# Patient Record
Sex: MALE
[De-identification: names, ages, dates, MRNs, and addresses within clinical notes are randomized per-mention and may not be internally consistent; named-entity substitution may affect disease eponyms.]

---

## 2017-08-07 ENCOUNTER — HOSPITAL ENCOUNTER (OUTPATIENT)
Dept: HOSPITAL 42 - ED | Age: 62
Setting detail: OBSERVATION
LOS: 3 days | Discharge: HOME | End: 2017-08-10
Attending: INTERNAL MEDICINE | Admitting: INTERNAL MEDICINE
Payer: MEDICARE

## 2017-08-07 VITALS — BODY MASS INDEX: 34.9 KG/M2

## 2017-08-07 DIAGNOSIS — H40.10X0: ICD-10-CM

## 2017-08-07 DIAGNOSIS — Z89.422: ICD-10-CM

## 2017-08-07 DIAGNOSIS — E11.65: ICD-10-CM

## 2017-08-07 DIAGNOSIS — E78.00: ICD-10-CM

## 2017-08-07 DIAGNOSIS — H54.7: ICD-10-CM

## 2017-08-07 DIAGNOSIS — I50.9: ICD-10-CM

## 2017-08-07 DIAGNOSIS — L97.529: ICD-10-CM

## 2017-08-07 DIAGNOSIS — H26.9: ICD-10-CM

## 2017-08-07 DIAGNOSIS — D64.9: ICD-10-CM

## 2017-08-07 DIAGNOSIS — E11.22: ICD-10-CM

## 2017-08-07 DIAGNOSIS — E11.39: ICD-10-CM

## 2017-08-07 DIAGNOSIS — E11.51: ICD-10-CM

## 2017-08-07 DIAGNOSIS — N18.9: ICD-10-CM

## 2017-08-07 DIAGNOSIS — R00.1: ICD-10-CM

## 2017-08-07 DIAGNOSIS — R07.89: ICD-10-CM

## 2017-08-07 DIAGNOSIS — Z79.82: ICD-10-CM

## 2017-08-07 DIAGNOSIS — I13.0: ICD-10-CM

## 2017-08-07 DIAGNOSIS — Z79.84: ICD-10-CM

## 2017-08-07 DIAGNOSIS — Z95.5: ICD-10-CM

## 2017-08-07 DIAGNOSIS — F32.9: ICD-10-CM

## 2017-08-07 DIAGNOSIS — F41.9: ICD-10-CM

## 2017-08-07 DIAGNOSIS — M50.80: Primary | ICD-10-CM

## 2017-08-07 DIAGNOSIS — M48.02: ICD-10-CM

## 2017-08-07 DIAGNOSIS — Z91.19: ICD-10-CM

## 2017-08-07 DIAGNOSIS — I25.10: ICD-10-CM

## 2017-08-07 DIAGNOSIS — E11.621: ICD-10-CM

## 2017-08-07 LAB
ALBUMIN SERPL-MCNC: 4.2 G/DL (ref 3–4.8)
ALBUMIN/GLOB SERPL: 1.3 {RATIO} (ref 1.1–1.8)
ALT SERPL-CCNC: 27 U/L (ref 7–56)
APPEARANCE UR: CLEAR
AST SERPL-CCNC: 35 U/L (ref 15–59)
BILIRUB UR-MCNC: NEGATIVE MG/DL
BUN SERPL-MCNC: 38 MG/DL (ref 7–21)
CALCIUM SERPL-MCNC: 9.4 MG/DL (ref 8.4–10.5)
COLOR UR: YELLOW
EPI CELLS #/AREA URNS HPF: (no result) /HPF (ref 0–5)
ERYTHROCYTE [DISTWIDTH] IN BLOOD BY AUTOMATED COUNT: 13.9 % (ref 11.5–14.5)
GFR NON-AFRICAN AMERICAN: 29
GLUCOSE UR STRIP-MCNC: 250 MG/DL
HGB BLD-MCNC: 10.5 GM/DL (ref 14–18)
LEUKOCYTE ESTERASE UR-ACNC: NEGATIVE LEU/UL
MCH RBC QN AUTO: 29.2 PG (ref 25–35)
MCHC RBC AUTO-ENTMCNC: 35.7 G/DL (ref 31–37)
MCV RBC AUTO: 81.7 FL (ref 80–105)
PH UR STRIP: 6 [PH] (ref 4.7–8)
PLATELET # BLD: 257 10^3/UL (ref 120–450)
PMV BLD AUTO: 10.5 FL (ref 7–11)
PROT UR STRIP-MCNC: NEGATIVE MG/DL
RBC # BLD AUTO: 3.6 10^6/UL (ref 3.5–6.1)
RBC # UR STRIP: (no result) /UL
RBC #/AREA URNS HPF: (no result) /HPF (ref 0–2)
SP GR UR STRIP: 1.01 (ref 1–1.03)
TROPONIN I SERPL-MCNC: 0.03 NG/ML
URINE NITRATE: NEGATIVE
UROBILINOGEN UR STRIP-ACNC: 0.2 E.U./DL
WBC # BLD AUTO: 8 10^3/UL (ref 4.5–11)
WBC #/AREA URNS HPF: (no result) /HPF (ref 0–6)

## 2017-08-07 PROCEDURE — 72050 X-RAY EXAM NECK SPINE 4/5VWS: CPT

## 2017-08-07 PROCEDURE — 84484 ASSAY OF TROPONIN QUANT: CPT

## 2017-08-07 PROCEDURE — 93005 ELECTROCARDIOGRAM TRACING: CPT

## 2017-08-07 PROCEDURE — 83540 ASSAY OF IRON: CPT

## 2017-08-07 PROCEDURE — 97116 GAIT TRAINING THERAPY: CPT

## 2017-08-07 PROCEDURE — 83036 HEMOGLOBIN GLYCOSYLATED A1C: CPT

## 2017-08-07 PROCEDURE — 99285 EMERGENCY DEPT VISIT HI MDM: CPT

## 2017-08-07 PROCEDURE — 83550 IRON BINDING TEST: CPT

## 2017-08-07 PROCEDURE — 87086 URINE CULTURE/COLONY COUNT: CPT

## 2017-08-07 PROCEDURE — 83615 LACTATE (LD) (LDH) ENZYME: CPT

## 2017-08-07 PROCEDURE — 82948 REAGENT STRIP/BLOOD GLUCOSE: CPT

## 2017-08-07 PROCEDURE — 80053 COMPREHEN METABOLIC PANEL: CPT

## 2017-08-07 PROCEDURE — 82550 ASSAY OF CK (CPK): CPT

## 2017-08-07 PROCEDURE — 85027 COMPLETE CBC AUTOMATED: CPT

## 2017-08-07 PROCEDURE — 80061 LIPID PANEL: CPT

## 2017-08-07 PROCEDURE — 82746 ASSAY OF FOLIC ACID SERUM: CPT

## 2017-08-07 PROCEDURE — 82607 VITAMIN B-12: CPT

## 2017-08-07 PROCEDURE — 81001 URINALYSIS AUTO W/SCOPE: CPT

## 2017-08-07 PROCEDURE — 36415 COLL VENOUS BLD VENIPUNCTURE: CPT

## 2017-08-07 PROCEDURE — 72141 MRI NECK SPINE W/O DYE: CPT

## 2017-08-07 PROCEDURE — 97162 PT EVAL MOD COMPLEX 30 MIN: CPT

## 2017-08-07 PROCEDURE — 71010: CPT

## 2017-08-07 RX ADMIN — ACETAZOLAMIDE SCH MG: 500 CAPSULE, EXTENDED RELEASE ORAL at 11:04

## 2017-08-07 RX ADMIN — METOPROLOL SUCCINATE SCH: 25 TABLET, EXTENDED RELEASE ORAL at 12:32

## 2017-08-07 RX ADMIN — INSULIN HUMAN SCH: 100 INJECTION, SOLUTION PARENTERAL at 18:12

## 2017-08-07 RX ADMIN — ACETAZOLAMIDE SCH MG: 500 CAPSULE, EXTENDED RELEASE ORAL at 18:15

## 2017-08-07 RX ADMIN — INSULIN HUMAN SCH: 100 INJECTION, SOLUTION PARENTERAL at 21:51

## 2017-08-07 RX ADMIN — INSULIN HUMAN SCH UNITS: 100 INJECTION, SOLUTION PARENTERAL at 12:31

## 2017-08-07 RX ADMIN — METOPROLOL SUCCINATE SCH: 25 TABLET, EXTENDED RELEASE ORAL at 18:11

## 2017-08-07 NOTE — CARD
--------------- APPROVED REPORT --------------





EKG Measurement

Heart [id number]

CA 204P45

[id number]

[id number]

[id number]



<Conclusion>

Sinus bradycardia

Septal infarct, age undetermined

T wave abnormality, consider lateral ischemia

Abnormal ECG

## 2017-08-07 NOTE — CARD
--------------- APPROVED REPORT --------------





EKG Measurement

Heart [id number]

CT 184P62

[id number]

[id number]

[id number]



<Conclusion>

Normal sinus rhythm

Left ventricular hypertrophy with repolarization abnormality

Cannot rule out Septal infarct, age undetermined

Consider lateral ischemia

Abnormal ECG

## 2017-08-07 NOTE — ED PDOC
Arrival/HPI





<Shreyas Stevens - Last Filed: 08/07/17 03:34>





- General


Historian: Patient





- History of Present Illness


Time/Duration: 24 hours


Symptom Onset: Sudden


Symptom Course: Unchanged


Quality: Stabbing





<Theodora Siu - Last Filed: 08/07/17 03:42>





- General


Chief Complaint: Chest Pain


Time Seen by Provider: 08/07/17 02:08





- History of Present Illness


Narrative History of Present Illness (Text): 





08/07/17 02:35


62 year old male with past medical history of CAD s/p stents, Diabetes, CKD 

presents for chest pain that began about 7 hours ago.  Pain is located on right 

side of chest and radiates to right neck and RUQ.  Pain is sharp in nature and 

constant. No association with position.  Patient states that he took Aspirin 81 

mg before coming to hospital. Patient also complaining of dry cough for past 

few days.  Denies having any F/C, V/n, SOB.  Patient also complaining of 

increased urinary frequency.  (Theodora Siu)





Past Medical History





- Provider Review


Nursing Documentation Reviewed: Yes





- Infectious Disease


Hx of Infectious Diseases: None





- Tetanus Immunization


Tetanus Immunization: Up to Date





- Cardiac


Hx Cardiac Disorders: Yes (CAD)


Hx Circulatory Problems: Yes


Hx Congestive Heart Failure: Yes


Hx Hypertension: Yes


Hx Pacemaker: No


Hx Peripheral Vascular Disease: Yes


Other/Comment: Stents x 6 in the heart.





- Pulmonary


Hx Respiratory Disorders: Yes


Hx Chronic Obstructive Pulmonary Disease (COPD): Yes


Hx Pneumonia: Yes





- Neurological


Hx Neurological Disorder: No





- HEENT


Hx HEENT Disorder: Yes (BLURRY VISION TO LEFT EYE)


Hx Cataracts: Yes (Right eye)





- Renal


Hx Renal Disorder: Yes


Hx Renal Failure: Yes





- Endocrine/Metabolic


Hx Endocrine Disorders: Yes


Hx Diabetes Mellitus Type 2: Yes





- Hematological/Oncological


Hx Blood Disorders: Yes


Other/Comment: hx mrsa





- Integumentary


Hx Dermatological Disorder: Yes (SCARRING TO AMPUTATED RIGHT AND LEFT TOES.ALL 

TOES L.RIGHT 3 TOES.)


Other/Comment: left foot ulcer on ball of foot and outer left calf open wound 

3cm x 1cm open wound purulent dng noted(PT. STATES IT IS HEALED)





- Musculoskeletal/Rheumatological


Hx Falls: No





- Gastrointestinal


Hx Gastrointestinal Disorders: Yes


Hx Gall Bladder Disease: Yes





- Genitourinary/Gynecological


Hx Genitourinary Disorders: No





- Psychiatric


Hx Psychophysiologic Disorder: Yes


Hx Anxiety: Yes


Hx Depression: Yes


Hx Substance Use: No





- Surgical History


Hx Amputation: Yes (of toes on both feet)


Hx Cardiac Catheterization: Yes


Hx Cholecystectomy: Yes


Hx Coronary Stent: Yes (6)


Hx Gastric Bypass Surgery: No


Hx Orthopedic Surgery: Yes (LEFT FT ALL TOES  AMP)


Other/Comment: Amputation of 3 toes right foot. Amputation of left foot toes.





- Anesthesia


Hx Anesthesia: Yes


Hx Anesthesia Reactions: No


Hx Malignant Hyperthermia: No





- Suicidal Assessment


Feels Threatened In Home Enviroment: No





<Theodora Siu - Last Filed: 08/07/17 03:42>





Family/Social History





- Physician Review


Nursing Documentation Reviewed: Yes


Family/Social History: No Known Family HX.  denies: CAD/MI


Smoking Status: Never Smoked


Hx Alcohol Use: No


Hx Substance Use: No


Hx Substance Use Treatment: No





<Theodora Siu - Last Filed: 08/07/17 03:42>





Allergies/Home Meds





<Shreyas Stevens - Last Filed: 08/07/17 03:34>





<Theodora Siu - Last Filed: 08/07/17 03:42>


Allergies/Adverse Reactions: 


Allergies





Penicillins Allergy (Verified 08/07/17 02:09)


 RASH








Home Medications: 


 Home Meds











 Medication  Instructions  Recorded  Confirmed


 


MetFORMIN [glucoPHAGE] 500 mg PO BID 05/12/14 08/07/17


 


Sitagliptin Phosphate [Januvia] 100 mg PO DAILY 05/12/14 08/07/17


 


Metformin HCl [Fortamet] 500 mg PO BID 09/21/16 08/07/17


 


acetaZOLAMIDE [Acetazolamide] 500 mg PO BID 09/21/16 08/07/17














Review of Systems





- Review of Systems


Constitutional: Normal.  absent: Fevers


Eyes: Normal.  absent: Vision Changes


ENT: Normal.  absent: Hearing Changes, Sore Throat


Respiratory: Normal.  absent: SOB, Cough, Wheezing


Cardiovascular: Chest Pain.  absent: Palpitations, Edema, Calf Pain


Gastrointestinal: Abdominal Pain.  absent: Constipation, Diarrhea, Nausea, 

Vomiting


Genitourinary Male: Frequency.  absent: Normal, Dysuria


Musculoskeletal: Normal.  absent: Arthralgias, Back Pain


Skin: absent: Rash, Pruritis, Skin Lesions


Neurological: Normal.  absent: Headache, Dizziness


Hemo/Lymphatic: Normal.  absent: Adenopathy


Psychiatric: Normal.  absent: Anxiety, Depression





<Theodora Siu - Last Filed: 08/07/17 03:42>





Physical Exam


Temperature: Afebrile


Blood Pressure: Hypertensive


Pulse: Regular


Respiratory Rate: Normal


Appearance: Positive for: Well-Appearing, Non-Toxic


Mental Status: Positive for: Alert and Oriented X 3





- Systems Exam


Head: Present: Atraumatic, Normocephalic


Mouth: Present: Moist Mucous Membranes


Neck: Present: Normal Range of Motion


Respiratory/Chest: Present: Clear to Auscultation, Good Air Exchange.  No: 

Respiratory Distress, Accessory Muscle Use, Wheezes, Rales, Rhonchi


Cardiovascular: Present: Regular Rate and Rhythm, Normal S1, S2.  No: Murmurs, 

Irregular Rhythm


Abdomen: Present: Tenderness (RUQ), Normal Bowel Sounds.  No: Distention, 

Peritoneal Signs, Guarding


Lower Extremity: Present: Normal Inspection.  No: Edema, CALF TENDERNESS


Neurological: Present: GCS=15


Skin: Present: Warm, Dry, Normal Color.  No: Rashes


Psychiatric: Present: Alert, Oriented x 3, Normal Insight, Normal Concentration





<Theodora Siu - Last Filed: 08/07/17 03:42>


Vital Signs











  Temp Pulse Resp BP Pulse Ox


 


 08/07/17 02:39  98.7 F  60  18  166/81 H  166 H














Medical Decision Making





- Lab Interpretations


I have reviewed the lab results: Yes





<Shreyas Stevens - Last Filed: 08/07/17 03:34>





- Lab Interpretations


I have reviewed the lab results: Yes





- EKG Interpretation


Interpreted by ED Physician: Yes


Type: 12 lead EKG





<SalbadorTheodora - Last Filed: 08/07/17 03:42>


ED Course and Treatment: 


In agreement with resident note, which includes further HPI details. Patient 

was seen and evaluated with resident, came up with plan and treatment together. 

62 year old male, whose past medical history includes CAD s/p stents, Diabetes, 

CKD complains of chest pain that began about 7 hours ago. Patient states the 

pain to be on the right side radiating to the neck and RUQ. 


--EKG


--Chest X-ray 


--Labs; Cardiac Enzymes 


--Urinalysis, Urine Culture








08/07/17 03:32


 (Shreyas Stevens)





08/07/17 02:41


62 year old male presents for chest pain r/o ACS vs. pleuritis vs. 

costochondritis 





Will check CBC, CMP, cardiac isoenzymes, urinalysis, urine culture, EKG and CXR





08/07/17 03:39


Dr. Briscoe is notified of patient.  Accepts patient under her service and 

request cardiologist, Dr. Berry's consult.   (Theodora Siu)





- Lab Interpretations


Narrative Lab Interpretation (Text): 





08/07/17 03:40


troponin-.03 (Theodora Siu)


Lab Results: 








 08/07/17 02:26 





 08/07/17 02:26 





 Lab Results





08/07/17 02:26: Sodium 139, Potassium 4.3, Chloride 105, Carbon Dioxide 22, 

Anion Gap 16, BUN 38 H, Creatinine 2.3 H, Est GFR ( Amer) 35, Est GFR (

Non-Af Amer) 29, Random Glucose 222 H, Calcium 9.4, Total Bilirubin 0.4, AST 35

, ALT 27, Alkaline Phosphatase 96, Lactate Dehydrogenase 520, Total Creatine 

Kinase 136, Troponin I 0.03  D, Total Protein 7.3, Albumin 4.2, Globulin 3.2, 

Albumin/Globulin Ratio 1.3


08/07/17 02:26: WBC 8.0  D, RBC 3.60, Hgb 10.5 L, Hct 29.4 L, MCV 81.7, MCH 29.2

, MCHC 35.7, RDW 13.9, Plt Count 257, MPV 10.5











- RAD Interpretation


Radiology Orders: 








08/07/17 02:32


CHEST PORTABLE [RAD] Stat 














- EKG Interpretation


EKG Interpretation (Text): 





08/07/17 02:42


NSR HR 62 No St changes, normal axis normal interval 


08/07/17 02:45


Unchanged from previous EKG (Theodora Siu)





Disposition/Present on Arrival





<Shreyas Stevens - Last Filed: 08/07/17 03:34>





- Present on Arrival


Any Indicators Present on Arrival: Yes


History of DVT/PE: No


History of Uncontrolled Diabetes: Yes


Urinary Catheter: No


History of Decub. Ulcer: No


History Surgical Site Infection Following: None





- Disposition


Have Diagnosis and Disposition been Completed?: Yes


Disposition Time: 03:41


Patient Plan: Telemetry





<Theodora Siu - Last Filed: 08/07/17 03:42>





- Disposition


Diagnosis: 


 Chest pain





Disposition: HOSPITALIZED


Patient Problems: 


 Current Active Problems











Problem Status Onset


 


Chest pain Acute  











Condition: STABLE


Discharge Instructions (ExitCare):  Chest Pain (ED)


Forms:  [facility] (English)

## 2017-08-07 NOTE — CON
DATE:  08/07/2017



HISTORY

The patient is a 62-year-old male who presents with atypical chest pain. 

His symptoms are not related to exertion, but are exacerbated by moving his

neck



PAST MEDICAL HISTORY:

The patient's past medical history is notable for documented coronary

disease and status post PTCA and stent in the past.



He also suffers from diabetes mellitus as well as chronic renal

insufficiency.



His cardiac risk factors also includes diabetes mellitus, and hypertension.



The patient is currently is free of symptoms at this time at rest.





SOCIAL HISTORY

He Does not smoke.



REVIEW OF SYSTEMS:

A 14  Point review of systems was reviewed in detail.  No additional

cardiac symptomatology is noted.



PHYSICAL EXAMINATION:

VITAL SIGNS:  Blood pressure varies from 132 to 154 systolic, heart rate in

the 50s.  Normal sinus rhythm.

NECK:  Negative JVD.

LUNGS:  Without rales.

HEART:  Reveals S1 and S2.

EXTREMITIES:  Without edema.



LABORATORY AND IMAGING DATA:

EKG shows no acute changes.



Hemoglobin is 10.5.  Chemistries, troponin 0.03 x3.  BUN and creatinine is

38 and 2.3.  The glucose is 180.





IMPRESSION



1.  Atypical chest pain.

2.  No evidence for acute coronary syndrome.

3.  Persistent neck pain.

4.  Documented percutaneous transluminal coronary angioplasty and coronary

disease in the past.

5.  Diabetes mellitus.

6.  Renal insufficiency.

7.  Hypertension.





Given these findings, and myocardial infarction has been ruled out.  There

is no evidence for acute coronary syndrome.



I have asked physical therapy to ambulate the patient.  In addition, we

will obtain x-rays of his neck to rule out an orthopedic cause of his neck

and chest discomfort.







__________________________________________

Stephen Camara MD







DD:  08/07/2017 11:35:51

DT:  08/07/2017 11:40:32

Job # [id number]

## 2017-08-07 NOTE — RAD
PROCEDURE:  Cervical Spine Radiographs.



HISTORY:

Pain. 



COMPARISON:

None. 



FINDINGS:



BONES:

Alignment maintained. No fracture.  Dens Intact. 



DISC SPACES:

Normal. 



SOFT TISSUES:

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 This 

can be symptomatic related to vascular compression.  Clinical 

correlation is suggested.



There is also ossification of the anterior longitudinal ligament 



OTHER FINDINGS:

None.



IMPRESSION:

 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 This 

can be symptomatic related to vascular compression.  Clinical 

correlation is suggested.   There is also ossification of the 

anterior longitudinal ligament

## 2017-08-07 NOTE — CP.PCM.HP
History of Present Illness





- History of Present Illness


History of Present Illness: 








08/07/17 


62 year old male with past medical history of CAD s/p stents, Diabetes, CKD 

presents for chest pain that began about 7 hours ago.  Pain is located on right 

side of chest and radiates to right neck and RUQ.  Pain is sharp in nature and 

constant. No association with position.  Patient states that he took Aspirin 81 

mg before coming to hospital. Patient also complaining of dry cough for past 

few days.  Denies having any F/C, V/n, SOB.  Patient also complaining of 

increased urinary frequency. 








Present on Admission





- Present on Admission


Any Indicators Present on Admission: No





Review of Systems





- Review of Systems


All systems: reviewed and no additional remarkable complaints except


Review of Systems: 





chest pain , neckpain , 





- Constitutional


Constitutional: As Per HPI





- EENT


Eyes: As Per HPI


Ears: As Per HPI


Nose/Mouth/Throat: As Per HPI





- Cardiovascular


Cardiovascular: As Per HPI





- Respiratory


Respiratory: As Per HPI





- Gastrointestinal


Gastrointestinal: As Per HPI





- Genitourinary


Genitourinary: As Per HPI





- Musculoskeletal


Musculoskeletal: As Per HPI





- Integumentary


Integumentary: As Per HPI





- Neurological


Neurological: As Per HPI





- Psychiatric


Psychiatric: As Per HPI





- Endocrine


Endocrine: As Per HPI





- Hematologic/Lymphatic


Hematologic: As Per HPI





Past Patient History





- Infectious Disease


Hx of Infectious Diseases: None





- Tetanus Immunizations


Tetanus Immunization: Up to Date





- Past Medical History & Family History


Past Medical History?: Yes





- Past Social History


Smoking Status: Never Smoked





- CARDIAC


Hx Cardiac Disorders: Yes (CAD)


Hx Circulatory Problems: Yes


Hx Congestive Heart Failure: Yes


Hx Hypertension: Yes


Hx Pacemaker: No


Hx Peripheral Vascular Disease: Yes


Other/Comment: Stents x 6 in the heart.





- PULMONARY


Hx Respiratory Disorders: Yes


Hx Chronic Obstructive Pulmonary Disease (COPD): Yes


Hx Pneumonia: Yes





- NEUROLOGICAL


Hx Neurological Disorder: No





- HEENT


Hx HEENT Problems: Yes (BLURRY VISION TO LEFT EYE)


Hx Cataracts: Yes (Right eye)





- RENAL


Hx Chronic Kidney Disease: Yes


Hx Renal Failure: Yes





- ENDOCRINE/METABOLIC


Hx Endocrine Disorders: Yes


Hx Diabetes Mellitus Type 2: Yes





- HEMATOLOGICAL/ONCOLOGICAL


Hx Blood Disorders: Yes


Other/Comment: hx mrsa





- INTEGUMENTARY


Hx Dermatological Problems: Yes (SCARRING TO AMPUTATED RIGHT AND LEFT TOES.ALL 

TOES L.RIGHT 3 TOES.)


Other/Comment: left foot ulcer on ball of foot and outer left calf open wound 

3cm x 1cm open wound purulent dng noted(PT. STATES IT IS HEALED)





- MUSCULOSKELETAL/RHEUMATOLOGICAL


Hx Falls: No





- GASTROINTESTINAL


Hx Gastrointestinal Disorders: Yes


Hx Gall Bladder Disease: Yes





- GENITOURINARY/GYNECOLOGICAL


Hx Genitourinary Disorders: No





- PSYCHIATRIC


Hx Substance Use: No





- SURGICAL HISTORY


Hx Amputation: Yes (of toes on both feet)


Hx Cardiac Catheterization: Yes


Hx Cholecystectomy: Yes


Hx Coronary Stent: Yes (6)


Hx Gastric Bypass Surgery: No


Hx Orthopedic Surgery: Yes (LEFT FT ALL TOES  AMP)


Other/Comment: Amputation of 3 toes right foot. Amputation of left foot toes.





- ANESTHESIA


Hx Anesthesia: Yes


Hx Anesthesia Reactions: No


Hx Malignant Hyperthermia: No





Meds


Allergies/Adverse Reactions: 


 Allergies











Allergy/AdvReac Type Severity Reaction Status Date / Time


 


Penicillins Allergy  RASH Verified 08/07/17 02:09














Physical Exam





- Constitutional


Appears: Well





- Head Exam


Head Exam: ATRAUMATIC, NORMAL INSPECTION, NORMOCEPHALIC





- Eye Exam


Eye Exam: EOMI, Normal appearance


Additional comments: 





pt is legally blind b/l 





- ENT Exam


ENT Exam: Mucous Membranes Moist, Normal Exam





- Neck Exam


Neck exam: Positive for: Normal Inspection





- Respiratory Exam


Respiratory Exam: Clear to Auscultation Bilateral, NORMAL BREATHING PATTERN





- Cardiovascular Exam


Cardiovascular Exam: REGULAR RHYTHM





- GI/Abdominal Exam


GI & Abdominal Exam: Normal Bowel Sounds, Soft.  absent: Tenderness





- Rectal Exam


Rectal Exam: NORMAL INSPECTION





-  Exam


 Exam: Circumcision, NORMAL INSPECTION


External exam: NORMAL EXTERNAL EXAM


Speculum exam: NORMAL SPECULUM EXAM


Bimanual exam: NORMAL BIMANUAL EXAM





- Extremities Exam


Extremities exam: Positive for: normal inspection





- Back Exam


Back exam: NORMAL INSPECTION





- Neurological Exam


Neurological exam: Alert, CN II-XII Intact, Normal Gait, Oriented x3, Reflexes 

Normal





- Psychiatric Exam


Psychiatric exam: Normal Affect, Normal Mood





- Skin


Skin Exam: Dry, Intact, Normal Color, Warm





Results





- Vital Signs


Recent Vital Signs: 





 Last Vital Signs











Temp  97.8 F   08/07/17 18:00


 


Pulse  55 L  08/07/17 18:15


 


Resp  18   08/07/17 18:00


 


BP  130/74   08/07/17 18:15


 


Pulse Ox  98   08/07/17 04:47














- Labs


Result Diagrams: 


 08/07/17 02:26





 08/07/17 02:26


Labs: 





 Laboratory Results - last 24 hr











  08/07/17 08/07/17 08/07/17





  04:44 07:41 08:28


 


POC Glucose (mg/dL)   180 H 


 


Lactate Dehydrogenase   


 


Total Creatine Kinase   


 


Troponin I    0.03


 


Urine Color  Yellow  


 


Urine Appearance  Clear  


 


Urine pH  6.0  


 


Ur Specific Gravity  1.010  


 


Urine Protein  Negative  


 


Urine Glucose (UA)  250 H  


 


Urine Ketones  Negative  


 


Urine Blood  Trace-lysed H  


 


Urine Nitrate  Negative  


 


Urine Bilirubin  Negative  


 


Urine Urobilinogen  0.2  


 


Ur Leukocyte Esterase  Negative  


 


Urine RBC  0 - 2  


 


Urine WBC  0 - 2  


 


Ur Epithelial Cells  0 - 2  














  08/07/17 08/07/17 08/07/17





  09:11 12:27 15:50


 


POC Glucose (mg/dL)   152 H 


 


Lactate Dehydrogenase  450   461


 


Total Creatine Kinase  122   146


 


Troponin I  0.03   0.03


 


Urine Color   


 


Urine Appearance   


 


Urine pH   


 


Ur Specific Gravity   


 


Urine Protein   


 


Urine Glucose (UA)   


 


Urine Ketones   


 


Urine Blood   


 


Urine Nitrate   


 


Urine Bilirubin   


 


Urine Urobilinogen   


 


Ur Leukocyte Esterase   


 


Urine RBC   


 


Urine WBC   


 


Ur Epithelial Cells   














  08/07/17 08/07/17





  16:07 21:46


 


POC Glucose (mg/dL)  150 H  180 H


 


Lactate Dehydrogenase  


 


Total Creatine Kinase  


 


Troponin I  


 


Urine Color  


 


Urine Appearance  


 


Urine pH  


 


Ur Specific Gravity  


 


Urine Protein  


 


Urine Glucose (UA)  


 


Urine Ketones  


 


Urine Blood  


 


Urine Nitrate  


 


Urine Bilirubin  


 


Urine Urobilinogen  


 


Ur Leukocyte Esterase  


 


Urine RBC  


 


Urine WBC  


 


Ur Epithelial Cells  














Assessment & Plan





- Assessment and Plan (Free Text)


Assessment: 





Patient was seen at bedside because he complained of chest pain.


Chest pain has been present for about two hours, 1/6, in mid sternal region, no 

radiation of pain but states that he also has pain in back of neck,denies sob, 

nausea, sweating ,palpitation.


Received first NTG SL that I had ordered which has not made much difference in 

the pain.


Medical records was reviewed.


62 year old male was admitted with chest pain.


Has PMH of  DM, CKD , CAD , coronary stents placementx6,chronic andemia, open 


angle glaucoma, PVD, non complaince, cholecystectomy, poor vision,right eye


cataract , anxiety , depression. 


seen by dr siobhan ramos , cardio  as per cardio , chest pain looking like musculo 

sketal , pt went for neck Xray . noted by me , will give flexril .,need neuro 

surgery consult

## 2017-08-07 NOTE — RAD
HISTORY:

chest pain  



COMPARISON:

09/21/2016 



FINDINGS:



LUNGS:

No active pulmonary disease.



PLEURA:

No significant pleural effusion identified, no pneumothorax apparent.



CARDIOVASCULAR:

Normal.



OSSEOUS STRUCTURES:

No significant abnormalities.



VISUALIZED UPPER ABDOMEN:

Normal.



OTHER FINDINGS:

None.



IMPRESSION:

No active disease.

## 2017-08-07 NOTE — CON
DATE:  08/07/2017



The patient was seen by Dr. Camara and had angioplasty done by Dr. Camara, so

we will turnover the case to him. I  will sign off from now and cancel the

consult for us, and put a consult for Dr. camara





Thank you 







__________________________________________

Chris Sandoval MD





DD:  08/07/2017 8:37:34

DT:  08/07/2017 10:22:46

Job # [id number]



DONG

## 2017-08-07 NOTE — CP.PCM.PN
Subjective





- Date & Time of Evaluation


Date of Evaluation: 08/07/17


Time of Evaluation: 06:16





- Subjective


Subjective: 





Patient was seen at bedside because he complained of chest pain.


Chest pain has been present for about two hours, 1/6, in mid sternal region, no 

radiation of pain but states that he also has pain in back of neck,denies sob, 

nausea, sweating ,palpitation.


Received first NTG SL that I had ordered which has not made much difference in 

the pain.


Medical records was reviewed.


62 year old male was admitted with chest pain.


Has PMH of  DM, CKD , CAD , coronary stents placementx6,chronic andemia, open 


angle glaucoma, PVD, non complaince, cholecystectomy, poor vision,right eye


cataract , anxiety , depression. 





Objective





- Vital Signs/Intake and Output


Vital Signs (last 24 hours): 


 











Temp Pulse Resp BP Pulse Ox


 


 98.7 F   54 L  16   149/65   98 


 


 08/07/17 02:39  08/07/17 04:47  08/07/17 04:47  08/07/17 04:47  08/07/17 04:47











- Labs


Labs: 





 Laboratory Last Values











WBC  8.0 10^3/ul (4.5-11.0)  D 08/07/17  02:26    


 


RBC  3.60 10^6/uL (3.5-6.1)   08/07/17  02:26    


 


Hgb  10.5 gm/dL (14.0-18.0)  L  08/07/17  02:26    


 


Hct  29.4 % (42.0-52.0)  L  08/07/17  02:26    


 


MCV  81.7 fL (80.0-105.0)   08/07/17  02:26    


 


MCH  29.2 pg (25.0-35.0)   08/07/17  02:26    


 


MCHC  35.7 g/dl (31.0-37.0)   08/07/17  02:26    


 


RDW  13.9 % (11.5-14.5)   08/07/17  02:26    


 


Plt Count  257 10^3/uL (120.0-450.0)   08/07/17  02:26    


 


MPV  10.5 fl (7.0-11.0)   08/07/17  02:26    


 


Sodium  139 mmol/L (132-148)   08/07/17  02:26    


 


Potassium  4.3 mmol/L (3.6-5.0)   08/07/17  02:26    


 


Chloride  105 mmol/L ()   08/07/17  02:26    


 


Carbon Dioxide  22 mmol/L (21-33)   08/07/17  02:26    


 


Anion Gap  16  (10-20)   08/07/17  02:26    


 


BUN  38 mg/dL (7-21)  H  08/07/17  02:26    


 


Creatinine  2.3 mg/dL (0.5-1.4)  H  08/07/17  02:26    


 


Est GFR ( Amer)  35   08/07/17  02:26    


 


Est GFR (Non-Af Amer)  29   08/07/17  02:26    


 


Random Glucose  222 mg/dL ()  H  08/07/17  02:26    


 


Calcium  9.4 mg/dL (8.4-10.5)   08/07/17  02:26    


 


Total Bilirubin  0.4 mg/dL (0.2-1.3)   08/07/17  02:26    


 


AST  35 U/L (15-59)   08/07/17  02:26    


 


ALT  27 U/L (7-56)   08/07/17  02:26    


 


Alkaline Phosphatase  96 U/L ()   08/07/17  02:26    


 


Lactate Dehydrogenase  520 U/L (333-699)   08/07/17  02:26    


 


Total Creatine Kinase  136 U/L ()   08/07/17  02:26    


 


Troponin I  0.03 ng/mL D 08/07/17  02:26    


 


Total Protein  7.3 g/dL (5.8-8.3)   08/07/17  02:26    


 


Albumin  4.2 g/dL (3.0-4.8)   08/07/17  02:26    


 


Globulin  3.2 gm/dL  08/07/17  02:26    


 


Albumin/Globulin Ratio  1.3  (1.1-1.8)   08/07/17  02:26    


 


Urine Color  Yellow  (YELLOW)   08/07/17  04:44    


 


Urine Appearance  Clear  (CLEAR)   08/07/17  04:44    


 


Urine pH  6.0  (4.7-8.0)   08/07/17  04:44    


 


Ur Specific Gravity  1.010  (1.005-1.035)   08/07/17  04:44    


 


Urine Protein  Negative mg/dL (<30 mg/dL)   08/07/17  04:44    


 


Urine Glucose (UA)  250 mg/dL (NEGATIVE)  H  08/07/17  04:44    


 


Urine Ketones  Negative mg/dL (NEGATIVE)   08/07/17  04:44    


 


Urine Blood  Trace-lysed  (NEGATIVE)  H  08/07/17  04:44    


 


Urine Nitrate  Negative  (NEGATIVE)   08/07/17  04:44    


 


Urine Bilirubin  Negative  (NEGATIVE)   08/07/17  04:44    


 


Urine Urobilinogen  0.2 E.U./dL (<1 E.U./dL)   08/07/17  04:44    


 


Ur Leukocyte Esterase  Negative Alex/uL (NEGATIVE)   08/07/17  04:44    


 


Urine RBC  0 - 2 /hpf (0-2)   08/07/17  04:44    


 


Urine WBC  0 - 2 /hpf (0-6)   08/07/17  04:44    


 


Ur Epithelial Cells  0 - 2 /hpf (0-5)   08/07/17  04:44    














- Constitutional


Appears: Well, No Acute Distress





- Head Exam


Head Exam: ATRAUMATIC, NORMAL INSPECTION, NORMOCEPHALIC





- Eye Exam


Additional comments: 





Right eye vision poor.





- Neck Exam


Neck Exam: Normal Inspection





- Respiratory Exam


Respiratory Exam: NORMAL BREATHING PATTERN





- Cardiovascular Exam


Cardiovascular Exam: Bradycardia, REGULAR RHYTHM, +S1 (Normal.), +S2 (Normal.).

  absent: JVD


Additional comments: 





Has mild tenderness in sternal area upon palpation.





- GI/Abdominal Exam


GI & Abdominal Exam: absent: Distended, Tenderness





- Rectal Exam


Rectal Exam: Deferred





-  Exam


Additional comments: 





Deferred.





- Extremities Exam


Extremities Exam: Normal Inspection





- Back Exam


Back Exam: NORMAL INSPECTION





- Neurological Exam


Neurological Exam: Alert, Awake, Oriented x3





- Psychiatric Exam


Psychiatric exam: Normal Affect, Normal Mood





- Skin


Skin Exam: Normal Color





Assessment and Plan





- Assessment and Plan (Free Text)


Assessment: 





Midsternal chest pain.


R/O cardiac origin.


Musculo-skeletal chest pain.


Sinus bradycardia.


CAD.


Hx coronary stents placement.


DM.


HTN.


CKD.


Anemia.


PVD.


Anxiety .


Depession.





Plan: 


EKG STAT------------------> Sinus bradycardia,poor r wave progression no change 

from previous EKG except for rate which is slower.


 Troponin--------->pending.


 0.4mg sublingual NTG q5m prn chest pain---> chest pain not helped after 

receiving 3 SL NGS's.


 Also patient felt dizzy and nauseous after receiving NTG's.


 Will give Naproxen 550 mg PO, Mylanta 30 CC  PO and Protonix 40 mg IV .


 Will follow troponin.

## 2017-08-08 RX ADMIN — INSULIN HUMAN SCH UNITS: 100 INJECTION, SOLUTION PARENTERAL at 12:09

## 2017-08-08 RX ADMIN — POLYETHYLENE GLYCOL 3350 PRN GM: 17 POWDER, FOR SOLUTION ORAL at 18:31

## 2017-08-08 RX ADMIN — ACETAZOLAMIDE SCH MG: 500 CAPSULE, EXTENDED RELEASE ORAL at 10:20

## 2017-08-08 RX ADMIN — INSULIN HUMAN SCH: 100 INJECTION, SOLUTION PARENTERAL at 21:33

## 2017-08-08 RX ADMIN — ACETAZOLAMIDE SCH MG: 500 CAPSULE, EXTENDED RELEASE ORAL at 18:35

## 2017-08-08 RX ADMIN — INSULIN HUMAN SCH UNITS: 100 INJECTION, SOLUTION PARENTERAL at 08:12

## 2017-08-08 RX ADMIN — INSULIN HUMAN SCH: 100 INJECTION, SOLUTION PARENTERAL at 16:36

## 2017-08-08 NOTE — CP.PCM.PN
Subjective





- Date & Time of Evaluation


Date of Evaluation: 08/08/17


Time of Evaluation: 00:13





- Subjective


Subjective: 


 Patient was seen for headache.


  Headache is mild, frontal , not radiating , not associated with dizziness, 

nausea, paraesthesia, weakness.


  Has PMH of  DM, CKD , CAD , coronary stents placementx6,chronic andemia, open 


  angle glaucoma, PVD, non complaince, cholecystectomy, poor vision,right eye 

cataract , anxiety , depression. 








Objective





- Vital Signs/Intake and Output


Vital Signs (last 24 hours): 


 











Temp Pulse Resp BP Pulse Ox


 


 97.8 F   55 L  18   130/74   98 


 


 08/07/17 18:00  08/07/17 18:15  08/07/17 18:00  08/07/17 18:15  08/07/17 04:47











- Medications


Medications: 


 Current Medications





Acetazolamide (Diamox Sequels 500 Mg Sr Cap)  500 mg PO BID [facility]


   Last Admin: 08/07/17 18:15 Dose:  500 mg


Amlodipine Besylate (Norvasc)  10 mg PO DAILY [facility]


   Last Admin: 08/07/17 11:03 Dose:  10 mg


Aspirin (Aspirin Chewable)  81 mg PO DAILY [facility]


   Last Admin: 08/07/17 11:04 Dose:  81 mg


Cyclobenzaprine HCl (Flexeril)  10 mg PO TID [facility]


Hydralazine HCl (Apresoline)  10 mg PO BID [facility]


   Last Admin: 08/07/17 18:15 Dose:  10 mg


Insulin Human Regular (Humulin R Low)  0 units SC ACHS [facility]


   PRN Reason: Protocol


   Last Admin: 08/07/17 21:51 Dose:  Not Given


Metoprolol Succinate (Toprol Xl)  25 mg PO BID [facility]


   Last Admin: 08/07/17 18:11 Dose:  Not Given


Sitagliptin Phosphate (Januvia)  100 mg PO DAILY [facility]


   Last Admin: 08/07/17 10:53 Dose:  Not Given











- Constitutional


Appears: Well, No Acute Distress





- Head Exam


Head Exam: ATRAUMATIC, NORMAL INSPECTION, NORMOCEPHALIC





- Eye Exam


Eye Exam: Normal appearance





- ENT Exam


ENT Exam: Normal External Ear Exam





- Neck Exam


Neck Exam: Normal Inspection





- Respiratory Exam


Respiratory Exam: NORMAL BREATHING PATTERN





- Cardiovascular Exam


Cardiovascular Exam: absent: JVD





- GI/Abdominal Exam


GI & Abdominal Exam: absent: Distended





- Rectal Exam


Rectal Exam: Deferred





-  Exam


Additional comments: 





Deferred.





- Extremities Exam


Extremities Exam: Normal Inspection





- Back Exam


Back Exam: NORMAL INSPECTION





- Neurological Exam


Neurological Exam: Alert, Oriented x3





- Psychiatric Exam


Psychiatric exam: Normal Affect, Normal Mood





- Skin


Skin Exam: Normal Color





Assessment and Plan





- Assessment and Plan (Free Text)


Assessment: 





Headache.


DM.


CKD.


CAD.


Coronary stent placement.


Chronic anemia.


PVD.


Glaucoma.


Anxiety.


Depression.

















Plan: 





Tylenol 650 mg PO stat.


Continue present management.

## 2017-08-08 NOTE — CP.PCM.PN
Subjective





- Date & Time of Evaluation


Date of Evaluation: 08/08/17


Time of Evaluation: 12:28





- Subjective


Subjective: 





anterior spinal calcifications are not normally treated as they do not cause 

neurologic compramise


will check MRI and make suggestions if apprpriate at that time





Objective





- Vital Signs/Intake and Output


Vital Signs (last 24 hours): 


 











Temp Pulse Resp BP Pulse Ox


 


 97.6 F   49 L  20   148/73   97 


 


 08/08/17 05:45  08/08/17 10:18  08/08/17 05:45  08/08/17 10:21  08/08/17 05:45








Intake and Output: 


 











 08/08/17 08/08/17





 06:59 18:59


 


Intake Total 220 


 


Output Total 800 


 


Balance -580 














- Medications


Medications: 


 Current Medications





Acetazolamide (Diamox Sequels 500 Mg Sr Cap)  500 mg PO BID [facility]


   Last Admin: 08/08/17 10:20 Dose:  500 mg


Amlodipine Besylate (Norvasc)  10 mg PO DAILY [facility]


   Last Admin: 08/08/17 10:21 Dose:  10 mg


Aspirin (Aspirin Chewable)  81 mg PO DAILY [facility]


   Last Admin: 08/08/17 10:20 Dose:  81 mg


Cyclobenzaprine HCl (Flexeril)  10 mg PO TID [facility]


   Last Admin: 08/08/17 10:20 Dose:  10 mg


Hydralazine HCl (Apresoline)  10 mg PO BID [facility]


   Last Admin: 08/08/17 10:18 Dose:  Not Given


Insulin Human Regular (Humulin R Low)  0 units SC ACHS [facility]


   PRN Reason: Protocol


   Last Admin: 08/08/17 12:09 Dose:  1 units


Sitagliptin Phosphate (Januvia)  100 mg PO DAILY [facility]


   Last Admin: 08/08/17 10:21 Dose:  Not Given

## 2017-08-08 NOTE — PN
DATE:  08/08/2017



SUBJECTIVE:  The patient's chest pain is better.  He is complaining

predominantly of neck pain.  Blood pressure is 130/63, heart rate is in the

40s sinus bradycardia with a history of bradycardia in the 30s during the

nighttime.



PHYSICAL EXAMINATION:

VITAL SIGNS:  Blood pressure 130/63, heart rate is noted.

LUNGS:  Clear to auscultation.

HEART:  Reveals S1, S2.

EXTREMITIES:  Without edema.



LABORATORY DATA:  His glucose is 194.  Hemoglobin was not measured today. 

Troponin is 0.03.



IMPRESSION:

1.  Atypical chest pain which is likely due to his neck pain.

2.  Cervical spine x-ray reveals ossification of ligament.  There is no

evidence for acute coronary syndrome.

3.  Diabetes mellitus.

4.  History of coronary artery disease.

5.  Hypertension.

6.  Bradycardia.



Given these findings, we will discontinue his Toprol.  We will discontinue

telemetry today.



No further cardiac workup is indicated at this time.  We will arrange for

an outpatient stress test which can be done next week after Toprol leaves

his system.





__________________________________________

Stephen Camara MD





DD:  08/08/2017 9:57:01

DT:  08/08/2017 10:01:53

Job # [id number]

## 2017-08-09 RX ADMIN — NAPROXEN SODIUM SCH MG: 550 TABLET ORAL at 17:43

## 2017-08-09 RX ADMIN — INSULIN HUMAN SCH: 100 INJECTION, SOLUTION PARENTERAL at 22:09

## 2017-08-09 RX ADMIN — INSULIN HUMAN SCH UNITS: 100 INJECTION, SOLUTION PARENTERAL at 17:32

## 2017-08-09 RX ADMIN — NAPROXEN SODIUM SCH MG: 550 TABLET ORAL at 09:16

## 2017-08-09 RX ADMIN — INSULIN HUMAN SCH UNITS: 100 INJECTION, SOLUTION PARENTERAL at 09:15

## 2017-08-09 RX ADMIN — ACETAZOLAMIDE SCH MG: 500 CAPSULE, EXTENDED RELEASE ORAL at 09:16

## 2017-08-09 RX ADMIN — ACETAZOLAMIDE SCH MG: 500 CAPSULE, EXTENDED RELEASE ORAL at 17:40

## 2017-08-09 RX ADMIN — INSULIN HUMAN SCH UNITS: 100 INJECTION, SOLUTION PARENTERAL at 12:00

## 2017-08-09 NOTE — CON
HISTORY OF PRESENT ILLNESS:  This is a 62-year-old male with past history

of cardiac disease with stents, diabetes, status post amputation of toes in

left lower extremity, walks with cane because of that, admitted with chest

pain, right-sided chest, radiating into right neck, right upper quadrant. 

MI had been ruled out.  The patient reports that he had several falls over

the last few weeks and was also complaining of neck pain.  Neck pain is

localized to the neck, radiating to the occiput and down between the

trapezius, does not go into his shoulders.  He says he has some low-grade

tingling in his arms.  This has been there for several weeks.  Denies

numbness in his legs.  He has an x-ray of his neck showing significant

anterior osteophytic formation from C2 down as far as the x-ray would show.

There are very large anterior osteophytes.  There appear all connected and

his entire anterior aspect of the vertebral bodies appeared to be

singularly fused.  The posterior vertebral line looks to be normal with no

obvious osteophytes on the film.  I have reviewed his medical record for

review of systems, family history, social history, medications, and

allergies with him.



PHYSICAL EXAMINATION:  At this point, his exam finds that he has some

mobility of his neck that is reduced in all directions.  He has some

tenderness over the posterior cervical musculature.  He has excellent

strength in all muscle groups, upper and lower.  His left ankle appears to

be fused and his toes are amputated.  His sensory exam is completely normal

to pin and touch throughout.  His reflexes are all 1/4.  There is no

Flowers's, no clonus, and no Babinski on the right.  I did not ambulate the

patient.



ASSESSMENT AND PLAN:  At this point, he needs an MRI to determine whether

or not there is any neuro compression.  The numbness in his arms may be

related to his diabetes.  It is unclear exactly how long and how severe

this subjective numbness is.  He has no sensory deficit to the hand.  I

will review the MRI after it is done; however, unless there is some

significant evidence of cord compression, my first line of treatment would

be conservative with physical therapy to his neck, either at subacute

facility or at home and I will then follow him in the office.  If indeed he

has some severe evidence of cord compression or disk disease, I will then

re-evaluate him while in the hospital.  I will follow up his MRI after it

is done.  If there are any questions, please do not hesitate contact me.







__________________________________________

William Cool MD



DD:  08/09/2017 10:02:27

DT:  08/09/2017 17:34:30

Job # [id number]

## 2017-08-09 NOTE — CP.PCM.PN
Subjective





- Date & Time of Evaluation


Date of Evaluation: 08/09/17


Time of Evaluation: 18:00





- Subjective


Subjective: 





reviewed MRI


mutiple bulges and areas of foraminal stenosis, with C5/6 being worst


Would recomment conservative tx for the neck pain particularly a coarse of PT


will follow as outpt in office





Objective





- Vital Signs/Intake and Output


Vital Signs (last 24 hours): 


 











Temp Pulse Resp BP Pulse Ox


 


 97.5 F L  55 L  19   116/56 L  97 


 


 08/09/17 16:30  08/09/17 16:30  08/09/17 16:30  08/09/17 16:30  08/09/17 16:30








Intake and Output: 


 











 08/09/17 08/09/17





 06:59 18:59


 


Intake Total 360 380


 


Balance 360 380














- Medications


Medications: 


 Current Medications





Acetazolamide (Diamox Sequels 500 Mg Sr Cap)  500 mg PO BID [facility] at [facility]


   Last Admin: 08/09/17 17:40 Dose:  500 mg


Amlodipine Besylate (Norvasc)  10 mg PO DAILY [facility] include 234 beds at the [facility]


   Last Admin: 08/09/17 09:28 Dose:  10 mg


Aspirin (Aspirin Chewable)  81 mg PO DAILY Counts include 234 beds at the [facility]


   Last Admin: 08/09/17 09:16 Dose:  81 mg


Cyclobenzaprine HCl (Flexeril)  10 mg PO TID [facility] include 234 beds at the [facility]


   Last Admin: 08/09/17 14:09 Dose:  10 mg


Hydralazine HCl (Apresoline)  10 mg PO BID [facility] include 234 beds at the [facility]


   Last Admin: 08/09/17 17:32 Dose:  Not Given


Insulin Human Regular (Humulin R Low)  0 units SC ACHS [facility] include 234 beds at the [facility]


   PRN Reason: Protocol


   Last Admin: 08/09/17 17:32 Dose:  1 units


Naproxen (Anaprox Ds)  550 mg PO BID [facility] include 234 beds at the [facility]


   Last Admin: 08/09/17 17:43 Dose:  550 mg


Polyethylene Glycol (Miralax)  17 gm PO DAILY [facility]


   PRN Reason: Constipation


   Last Admin: 08/08/17 18:31 Dose:  17 gm


Sitagliptin Phosphate (Januvia)  50 mg PO DAILY Counts include 234 beds at the [facility]


   Last Admin: 08/09/17 10:07 Dose:  Not Given

## 2017-08-09 NOTE — MRI
PROCEDURE:  MR CERVICAL SPINE WITHOUT CONTRAST



HISTORY:

neck pain



COMPARISON:

None available. 



TECHNIQUE:

Multiecho multiplanar sequences were performed through the cervical 

spine without the use of intravenous contrast.



FINDINGS:

Normal lordotic curvature. 



Craniocervical junction unremarkable.



Vertebral body heights preserved.



No marrow signal abnormality.



Normal cervical cord.



No paraspinal abnormality. 



C2-C3:

Mild disc bulge without stenosis 



C3-C4:

Disc bulge with right-sided foraminal stenosis 



C4-C5:

Disc bulge with mild right-sided foraminal stenosis 



C5-C6:

Disc bulge an osteophyte complex with severe bilateral foraminal 

stenosis and mild to moderate central stenosis 



C6-C7:

Disc bulge in degeneration with moderate bilateral foraminal 

stenosis. 



C7-T1:

Disc bulge with severe bilateral foraminal stenosis 



OTHER FINDINGS:

None. 



IMPRESSION:

Multilevel disc degeneration and foraminal stenosis. Mild to moderate 

central stenosis at C5-6.  See comments

## 2017-08-09 NOTE — PN
DATE:



SUBJECTIVE:  The patient was examined on the bedside on the 5th floor,

looks not in distress, but daytime, he has headaches, doctor decides to see

the patient for headache.  He has constant neck pain.  Neurosurgery consult

called with William Cool.  According to him, he needs stat MRI

*------* and the patient is not cooperative for MRI.  Dose of Ativan

return.  We will follow up with the MRI.



PHYSICAL EXAMINATION:

VITAL SIGNS:  Temperature 98.0, pulse 55, blood pressure 145/70, and

respiratory rate 20.

HEENT:  Head:  Normocephalic atraumatic.  Eyes; PERRLA.  Extraocular

muscles intact.  Conjunctivae clear.  Nose is patent.  Mucous membranes

moist.

NECK:  Supple.  No carotid bruit.  No JVD.  No thyromegaly.

CHEST:  Bilaterally symmetrical.

HEART:  S1 and S2 positive.

LUNGS:  Clear to auscultation.

ABDOMEN:  Soft.  Bowel sounds present.  No organomegaly.

EXTREMITIES:  No edema.  No cyanosis.

NEUROLOGIC:  The patient is awake and alert.  Moving all 4 extremities.  No

focal deficit.



MEDICATIONS:  Hydralazine, aspirin, Diamox, Flexeril, insulin, Januvia,

MiraLAX, and amlodipine.



LABORATORY DATA:  White blood cell 8.0, hemoglobin 10.5, hematocrit 37.4,

and platelets 257.  Glucose 171, 195, 180.



ASSESSMENT AND PLAN:  Mr. Dawit Hicks is 62 years old male with

multiple medical problems, has intractable neck pain, seen by Dr. William Cool.  According to him, spinal calcifications are not normally

treated as they do not cause the neurological compromise.  We will check

MRI and make suggestion, if appropriate at that time as per neurosurgeon. 

Seen by the cardiologist, Dr. Stephen Camara.  According to him, the patient

has atypical chest pain, which is likely due to neck pain.  Cervical spine

x-ray has ossification of ligament.  There is no evidence of acute coronary

syndrome,diabetes mellitus, history of coronary artery disease,

hypertension, and bradycardia.  Dr. Stephen Camara discontinued the Toprol. 

Discontinued the telemetry.  History of hypercholesterolemia.  Pain

management and muscle relaxant, history of a chronic kidney disease,

coronary artery stent placement x6, glaucoma, peripheral vascular disease,

and noncompliance, history of cholecystectomy, poor vision, 

anxiety, depression.  Continue present treatment, GI and DVT prophylaxis. 

Repeat labs.  We will follow.







__________________________________________

Suzette Briscoe MD



DD:  08/08/2017 20:50:34

DT:  08/08/2017 23:10:56

Job # [id number]



[email or web address]

## 2017-08-10 VITALS — DIASTOLIC BLOOD PRESSURE: 77 MMHG | SYSTOLIC BLOOD PRESSURE: 120 MMHG

## 2017-08-10 VITALS — RESPIRATION RATE: 20 BRPM | OXYGEN SATURATION: 98 % | TEMPERATURE: 97.9 F | HEART RATE: 63 BPM

## 2017-08-10 LAB
% IRON SATURATION: 33 % (ref 20–55)
FOLATE SERPL-MCNC: 17.1 NG/ML
HDLC SERPL-MCNC: 32 MG/DL (ref 29–60)
IRON SERPL-MCNC: 88 UG/DL (ref 45–180)
LDLC SERPL-MCNC: 72 MG/DL (ref 0–129)
TIBC SERPL-MCNC: 265 UG/DL (ref 261–462)
VIT B12 SERPL-MCNC: 770 PG/ML (ref 239–931)

## 2017-08-10 RX ADMIN — INSULIN HUMAN SCH UNITS: 100 INJECTION, SOLUTION PARENTERAL at 08:51

## 2017-08-10 RX ADMIN — POLYETHYLENE GLYCOL 3350 PRN GM: 17 POWDER, FOR SOLUTION ORAL at 09:18

## 2017-08-10 RX ADMIN — ACETAZOLAMIDE SCH MG: 500 CAPSULE, EXTENDED RELEASE ORAL at 09:16

## 2017-08-10 NOTE — PN
DATE:  08/09/2017



SUBJECTIVE:  The patient was seen and examined on 08/09/2017.  Neck pain is

getting better.  No nausea, vomiting or diarrhea.  No hematuria or

hematochezia.  No headache and dizziness.  No chest pain or palpitation.



PHYSICAL EXAMINATION:

VITAL SIGNS:  Temperature 97.5, pulse 55, blood pressure 116/56 and

respiratory rate 18.

HEENT:  Head is normocephalic and atraumatic.  Eyes; PERRLA.  Extraocular

muscles intact.  Conjunctivae clear.  Nose is patent.

NECK:  Supple.  No carotid bruit.  No JVD.  No thyromegaly.

CHEST:  Bilaterally symmetrical.

HEART:  S1 and S2 positive.

LUNGS:  Clear to auscultation.

ABDOMEN:  Soft.  Bowel sounds present.  No organomegaly.

EXTREMITIES:  No edema.  No cyanosis.

NEUROLOGIC:  The patient is awake and alert.  Moving all 4 extremities.  No

focal deficit.



MEDICATIONS:  Naproxen, hydralazine, aspirin, acetazolamide, Flexeril,

insulin, Januvia, MiraLax and Norvasc.



LABORATORY DATA:  We do not have recent labs today, but I reviewed old

labs.  Sugar is 113, 184 and 209.



ASSESSMENT AND PLAN:  Mr. Pauline Pastor is a 62-year-old male with

severe neck pain went for MRI, seen by neurosurgeon, Dr. William Cool.  MRI shows multiple bulges and areas of foraminal stenosis

with C5/C6 being worst and neurosurgeon recommended conservative treatment

for the neck pain.  Particularly course of physical therapy will follow as

an outpatient in the office by the neurosurgeon.  Seen by Dr. Stephen Camara,

cardiologist.  The patient's atypical chest pain, which is likely due to

neck pain, diabetes mellitus uncontrolled, history of coronary artery

disease status post cardiac stenting, hypertension, bradycardia, renal

insufficiency stable, need physical therapy.  We will try to do TCU,

gastrointestinal/deep venous thrombosis prophylaxis, repeat labs.  We will

followup.





__________________________________________

Suzette Briscoe MD



DD:  08/10/2017 0:16:29

DT:  08/10/2017 3:04:01

Job # [id number]

## 2017-08-10 NOTE — PN
DATE:  08/10/2017



SUBJECTIVE:  The patient's main complaint is his neck pain.



PHYSICAL EXAMINATION

VITAL SIGNS:  Blood pressure is 120/70, the heart rate is in the 60s.

NECK:  Negative JVD.

LUNGS:  Without rales.

HEART:  S1, S2.

EXTREMITIES:  Without edema.



LABORATORY DATA:  Glucose is 159.



IMPRESSION

1.  Cervical disc disease.

2.  Atypical chest pain without evidence for acute coronary syndrome.

3.  History of coronary artery disease.

4.  Diabetes mellitus.

5.  Hypertension.



Given these findings, his bradycardia is stable, off Toprol.



The patient is for transfer to TCU with continued follow up with neuro

surgery.







__________________________________________

Stephen Camara MD





DD:  08/10/2017 10:29:43

DT:  08/10/2017 10:36:30

Job # [id number]

## 2017-08-18 ENCOUNTER — HOSPITAL ENCOUNTER (INPATIENT)
Dept: HOSPITAL 42 - ED | Age: 62
LOS: 5 days | Discharge: NURSING, REHAB OR LONG-TERM CARE | DRG: 82 | End: 2017-08-23
Attending: INTERNAL MEDICINE | Admitting: INTERNAL MEDICINE
Payer: MEDICARE

## 2017-08-18 VITALS — BODY MASS INDEX: 34.9 KG/M2

## 2017-08-18 DIAGNOSIS — I21.4: ICD-10-CM

## 2017-08-18 DIAGNOSIS — L03.90: ICD-10-CM

## 2017-08-18 DIAGNOSIS — Z89.412: ICD-10-CM

## 2017-08-18 DIAGNOSIS — N17.9: ICD-10-CM

## 2017-08-18 DIAGNOSIS — I08.1: ICD-10-CM

## 2017-08-18 DIAGNOSIS — E86.0: ICD-10-CM

## 2017-08-18 DIAGNOSIS — Z89.422: ICD-10-CM

## 2017-08-18 DIAGNOSIS — D64.9: ICD-10-CM

## 2017-08-18 DIAGNOSIS — I13.0: ICD-10-CM

## 2017-08-18 DIAGNOSIS — I50.9: ICD-10-CM

## 2017-08-18 DIAGNOSIS — S06.369A: Primary | ICD-10-CM

## 2017-08-18 DIAGNOSIS — F41.9: ICD-10-CM

## 2017-08-18 DIAGNOSIS — Z79.899: ICD-10-CM

## 2017-08-18 DIAGNOSIS — Z88.0: ICD-10-CM

## 2017-08-18 DIAGNOSIS — Z91.19: ICD-10-CM

## 2017-08-18 DIAGNOSIS — H53.8: ICD-10-CM

## 2017-08-18 DIAGNOSIS — R40.2412: ICD-10-CM

## 2017-08-18 DIAGNOSIS — Z86.14: ICD-10-CM

## 2017-08-18 DIAGNOSIS — I25.10: ICD-10-CM

## 2017-08-18 DIAGNOSIS — G47.30: ICD-10-CM

## 2017-08-18 DIAGNOSIS — Z99.2: ICD-10-CM

## 2017-08-18 DIAGNOSIS — E87.1: ICD-10-CM

## 2017-08-18 DIAGNOSIS — E78.00: ICD-10-CM

## 2017-08-18 DIAGNOSIS — E66.9: ICD-10-CM

## 2017-08-18 DIAGNOSIS — H26.9: ICD-10-CM

## 2017-08-18 DIAGNOSIS — Z89.421: ICD-10-CM

## 2017-08-18 DIAGNOSIS — Z79.82: ICD-10-CM

## 2017-08-18 DIAGNOSIS — N18.9: ICD-10-CM

## 2017-08-18 DIAGNOSIS — Z90.49: ICD-10-CM

## 2017-08-18 DIAGNOSIS — E11.22: ICD-10-CM

## 2017-08-18 DIAGNOSIS — E11.51: ICD-10-CM

## 2017-08-18 DIAGNOSIS — Z95.5: ICD-10-CM

## 2017-08-18 DIAGNOSIS — J44.9: ICD-10-CM

## 2017-08-18 DIAGNOSIS — F32.89: ICD-10-CM

## 2017-08-18 DIAGNOSIS — Z87.01: ICD-10-CM

## 2017-08-18 DIAGNOSIS — E78.5: ICD-10-CM

## 2017-08-18 DIAGNOSIS — Z91.81: ICD-10-CM

## 2017-08-18 DIAGNOSIS — H54.8: ICD-10-CM

## 2017-08-18 LAB
ALBUMIN/GLOB SERPL: 1.4 {RATIO} (ref 1.1–1.8)
ALBUMIN/GLOB SERPL: 1.5 {RATIO} (ref 1.1–1.8)
ALP SERPL-CCNC: 124 U/L (ref 38–133)
ALP SERPL-CCNC: 88 U/L (ref 38–133)
ALT SERPL-CCNC: 29 U/L (ref 7–56)
ALT SERPL-CCNC: 30 U/L (ref 7–56)
ANISOCYTOSIS BLD QL SMEAR: (no result)
APPEARANCE UR: CLEAR
APTT BLD: 23.1 SECONDS (ref 23.7–30.8)
AST SERPL-CCNC: 37 U/L (ref 15–59)
AST SERPL-CCNC: 37 U/L (ref 15–59)
BASE EXCESS BLDV CALC-SCNC: -10.4 MMOL/L (ref 0–2)
BASE EXCESS BLDV CALC-SCNC: -12.6 MMOL/L (ref 0–2)
BASOPHILS # BLD AUTO: 0.01 K/MM3 (ref 0–2)
BASOPHILS NFR BLD: 0.1 % (ref 0–3)
BILIRUB SERPL-MCNC: 0.5 MG/DL (ref 0.2–1.3)
BILIRUB SERPL-MCNC: 0.7 MG/DL (ref 0.2–1.3)
BILIRUB UR-MCNC: NEGATIVE MG/DL
BUN SERPL-MCNC: 36 MG/DL (ref 7–21)
BUN SERPL-MCNC: 39 MG/DL (ref 7–21)
BUN SERPL-MCNC: 42 MG/DL (ref 7–21)
CALCIUM SERPL-MCNC: 8.9 MG/DL (ref 8.4–10.5)
CALCIUM SERPL-MCNC: 9.5 MG/DL (ref 8.4–10.5)
CALCIUM SERPL-MCNC: 9.8 MG/DL (ref 8.4–10.5)
CHLORIDE SERPL-SCNC: 110 MMOL/L (ref 98–107)
CHLORIDE SERPL-SCNC: 115 MMOL/L (ref 98–107)
CHLORIDE SERPL-SCNC: 118 MMOL/L (ref 98–107)
CO2 SERPL-SCNC: 12 MMOL/L (ref 21–33)
CO2 SERPL-SCNC: 15 MMOL/L (ref 21–33)
CO2 SERPL-SCNC: 15 MMOL/L (ref 21–33)
COLOR UR: YELLOW
EOSINOPHIL # BLD: 0 10*3/UL (ref 0–0.7)
EOSINOPHIL NFR BLD: 0 % (ref 1.5–5)
ERYTHROCYTE [DISTWIDTH] IN BLOOD BY AUTOMATED COUNT: 14.6 % (ref 11.5–14.5)
GLOBULIN SER-MCNC: 2.7 GM/DL
GLOBULIN SER-MCNC: 3.3 GM/DL
GLUCOSE SERPL-MCNC: 148 MG/DL (ref 70–110)
GLUCOSE SERPL-MCNC: 165 MG/DL (ref 70–110)
GLUCOSE SERPL-MCNC: 379 MG/DL (ref 70–110)
GLUCOSE UR STRIP-MCNC: >=1000 MG/DL
GRANULOCYTES # BLD: 14.03 10*3/UL (ref 1.4–6.5)
GRANULOCYTES NFR BLD: 91.1 % (ref 50–68)
HCT VFR BLD CALC: 32.7 % (ref 42–52)
HYPOCHROMIA BLD QL SMEAR: SLIGHT
INR PPP: 1.06 (ref 0.93–1.08)
IRON SERPL-MCNC: 56 UG/DL (ref 45–180)
KETONES UR STRIP-MCNC: 15 MG/DL
LEUKOCYTE ESTERASE UR-ACNC: NEGATIVE LEU/UL
LYMPHOCYTES # BLD: 0.7 10*3/UL (ref 1.2–3.4)
LYMPHOCYTES NFR BLD AUTO: 4.8 % (ref 22–35)
MAGNESIUM SERPL-MCNC: 1.8 MG/DL (ref 1.7–2.2)
MAGNESIUM SERPL-MCNC: 1.8 MG/DL (ref 1.7–2.2)
MAGNESIUM SERPL-MCNC: 1.9 MG/DL (ref 1.7–2.2)
MCH RBC QN AUTO: 29.1 PG (ref 25–35)
MCHC RBC AUTO-ENTMCNC: 36.1 G/DL (ref 31–37)
MCV RBC AUTO: 80.7 FL (ref 80–105)
MICROCYTES BLD QL SMEAR: (no result)
MONOCYTES # BLD AUTO: 0.6 10*3/UL (ref 0.1–0.6)
MONOCYTES NFR BLD: 4 % (ref 1–6)
NEUTROPHILS NFR BLD AUTO: 90 % (ref 50–70)
PH BLDV: 7.23 [PH] (ref 7.32–7.43)
PH BLDV: 7.3 [PH] (ref 7.32–7.43)
PH UR STRIP: 6 [PH] (ref 4.7–8)
PHOSPHATE SERPL-MCNC: 1.9 MG/DL (ref 2.5–4.5)
PLATELET # BLD EST: NORMAL 10*3/UL
PLATELET # BLD: 307 10^3/UL (ref 120–450)
PMV BLD AUTO: 10.7 FL (ref 7–11)
POTASSIUM SERPL-SCNC: 3.9 MMOL/L (ref 3.6–5)
POTASSIUM SERPL-SCNC: 4.1 MMOL/L (ref 3.6–5)
POTASSIUM SERPL-SCNC: 4.7 MMOL/L (ref 3.6–5)
PROT SERPL-MCNC: 6.3 G/DL (ref 5.8–8.3)
PROT SERPL-MCNC: 8.1 G/DL (ref 5.8–8.3)
PROT UR STRIP-MCNC: 100 MG/DL
RBC # UR STRIP: (no result) /UL
SODIUM SERPL-SCNC: 142 MMOL/L (ref 132–148)
SODIUM SERPL-SCNC: 143 MMOL/L (ref 132–148)
SODIUM SERPL-SCNC: 145 MMOL/L (ref 132–148)
SP GR UR STRIP: 1.02 (ref 1–1.03)
TROPONIN I SERPL-MCNC: 0.36 NG/ML
TROPONIN I SERPL-MCNC: 0.75 NG/ML
UROBILINOGEN UR STRIP-ACNC: 0.2 E.U./DL
WBC # BLD AUTO: 15.4 10^3/UL (ref 4.5–11)
WBC #/AREA URNS HPF: NEGATIVE /HPF (ref 0–6)

## 2017-08-18 RX ADMIN — AZTREONAM SCH MLS/HR: 1 INJECTION, SOLUTION INTRAVENOUS at 21:24

## 2017-08-18 RX ADMIN — VANCOMYCIN HYDROCHLORIDE SCH MLS/HR: 1 INJECTION, POWDER, LYOPHILIZED, FOR SOLUTION INTRAVENOUS at 16:05

## 2017-08-18 RX ADMIN — AZTREONAM SCH MLS/HR: 1 INJECTION, SOLUTION INTRAVENOUS at 15:04

## 2017-08-18 NOTE — RAD
HISTORY:

syncope  



COMPARISON:

08/07/2017 



FINDINGS:



LUNGS:

No active pulmonary disease.



PLEURA:

No significant pleural effusion identified, no pneumothorax apparent.



CARDIOVASCULAR:

Mild cardiomegaly



OSSEOUS STRUCTURES:

No significant abnormalities.



VISUALIZED UPPER ABDOMEN:

Normal.



OTHER FINDINGS:

None.



IMPRESSION:

No active disease.

## 2017-08-18 NOTE — CARD
--------------- APPROVED REPORT --------------





EXAM: Two-dimensional and M-mode echocardiogram with Doppler and 

color Doppler.



INDICATION

INTRACRANIAL HEMORRAGE



2D DIMENSIONS 

Left Atrium (2D)4.8   (1.6-4.0cm)IVSd1.1   (0.7-1.1cm)

LVDd5.5   (3.9-5.9cm)PWd1.0   (0.7-1.1cm)

LVDs4.4   (2.5-4.0cm)FS (%) 19.1   %

LVEF (%)39.0   (>50%)



M-Mode DIMENSIONS 

Aortic Root3.10   (2.2-3.7cm)Aortic Cusp Exc.1.80   (1.5-2.0cm)



Aortic Valve

AoV Peak Cfjutyhh682.0cm/Ismael Peak GR.9mmHg



Mitral Valve

MV E Dgqzlsml55.7cm/sMV A Ztpjhijl929.0cm/sE/A ratio0.6



TDI

Lateral E' Peak V7.21cm/sMedial E' Peak V4.78cm/sE/Lateral E'8.7

E/Medial E'13.1



Pulmonary Valve

PV Peak Kfyaoppc026.0cm/sPV Peak Grad.4mmHg



Tricuspid Valve

TR Peak Irwzfsmt532pz/sRAP [id number] Peak Gr.8mmHg

[id number]



 LEFT VENTRICLE 

The Left Ventricle is borderline dilated. There is normal left 

ventricular wall thickness. The systolic function is moderately 

impaired.EF-35-40% There is mild to moderate hypokinesis in the 

apical anterior wall. Transmitral Doppler flow pattern is Grade 

III-reversible restrictive diastolic dysfunction. No left ventricle 

thrombus noted on this study. There is no ventricular septal defect 

visualized. There is no left ventricular aneurysm. There is no mass 

noted in the left ventricle.



 RIGHT VENTRICLE 

The right ventricle is normal size. There is normal right ventricular 

wall thickness. The right ventricular systolic function is normal.



 ATRIA 

The left atrium is mildly dilated. The right atrium size is normal. 

The interatrial septum is intact with no evidence for an atrial 

septal defect.



 AORTIC VALVE 

The aortic valve is thickened but opens well. The aortic valve is 

severely thickened. There is trace aortic regurgitation. There is no 

aortic valvular stenosis. There is no aortic valvular vegetation.



 MITRAL VALVE 

The mitral valve is thickened but opens well. Mitral regurgitation is 

mild. There is no mitral valve stenosis. There is no evidence of 

mitral valve prolapse.



 TRICUSPID VALVE 

The tricuspid valve leaflets are thickened , but open well. There is 

trace tricuspid regurgitation.RVSP-18 mmof Hg. There is no tricuspid 

valve stenosis. There is no tricuspid valve prolapse or vegetation.



 PULMONIC VALVE 

The pulmonary valve is normal in structure. There is trace pulmonic 

valvular regurgitation. There is no pulmonic valvular stenosis.



 GREAT VESSELS 

The aortic root is normal in size. The ascending aorta is normal in 

size. The pulmonary artery is normal. The IVC is normal in size and 

collapses >50% with inspiration.



 PERICARDIAL EFFUSION 

There is no pleural effusion. There is no pericardial effusion.



<Conclusion>

The Left Ventricle is borderline dilated.

There is normal left ventricular wall thickness.

The systolic function is moderately impaired.EF-35-40%

There is trace aortic regurgitation.

Mitral regurgitation is mild.

There is trace tricuspid regurgitation.RVSP-18 mmof Hg.

The IVC is normal in size and collapses >50% with inspiration.

There is no pericardial effusionNo vegetation or thrombus noted.

## 2017-08-18 NOTE — CP.PCM.CON
History of Present Illness





- History of Present Illness


History of Present Illness: 





Mr. Carpenter is a 62-year-old man who is blind and requires home health aids

, hypertension, DM, who was found down by his aid and was brought to the ED 

where a CT head revealed a right frontal-parietal intraparenchymal hemorrhage.  

The patient was evaluated in the ICU and was clinically stable.  He was found 

to be in DKA.  He was able to follow commands appropriately and told me that he 

feels weaker on the left and the reason he fell is because he ran into the 

wall. 





Review of Systems





- Review of Systems


All systems: reviewed and no additional remarkable complaints except





Past Patient History





- Infectious Disease


Hx of Infectious Diseases: None





- Tetanus Immunizations


Tetanus Immunization: Up to Date





- Past Medical History & Family History


Past Medical History?: Yes





- Past Social History


Smoking Status: Never Smoked





- CARDIAC


Hx Cardiac Disorders: Yes (CAD)


Hx Circulatory Problems: Yes


Hx Congestive Heart Failure: Yes


Hx Hypertension: Yes


Hx Pacemaker: No


Hx Peripheral Vascular Disease: Yes


Other/Comment: Stents x 6 in the heart.





- PULMONARY


Hx Respiratory Disorders: Yes


Hx Chronic Obstructive Pulmonary Disease (COPD): Yes


Hx Pneumonia: Yes





- NEUROLOGICAL


Hx Neurological Disorder: No





- HEENT


Hx HEENT Problems: Yes (BLURRY VISION TO LEFT EYE)


Hx Cataracts: Yes (Right eye)





- RENAL


Hx Chronic Kidney Disease: Yes


Hx Renal Failure: Yes





- ENDOCRINE/METABOLIC


Hx Endocrine Disorders: Yes


Hx Diabetes Mellitus Type 2: Yes





- HEMATOLOGICAL/ONCOLOGICAL


Hx Blood Disorders: Yes


Other/Comment: hx mrsa,LEGIONELLA IN THE URINE 2014





- INTEGUMENTARY


Hx Dermatological Problems: Yes (SCARRING TO AMPUTATED RIGHT AND LEFT TOES.ALL 

TOES L.RIGHT 3 TOES.)





- MUSCULOSKELETAL/RHEUMATOLOGICAL


Hx Musculoskeletal Disorders: Yes (RHABDOMYOLYSIS)


Hx Falls: Yes (8-18-17)


Hx Osteomyelitis: Yes





- GASTROINTESTINAL


Hx Gastrointestinal Disorders: Yes


Hx Gall Bladder Disease: Yes





- GENITOURINARY/GYNECOLOGICAL


Hx Genitourinary Disorders: No





- PSYCHIATRIC


Hx Psychophysiologic Disorder: Yes


Hx Anxiety: Yes


Hx Depression: Yes


Hx Substance Use: No





- SURGICAL HISTORY


Hx Surgeries: Yes


Hx Amputation: Yes (of toes on both feet)


Hx Cardiac Catheterization: Yes


Hx Cholecystectomy: Yes


Hx Coronary Stent: Yes (6)


Hx Gastric Bypass Surgery: No


Hx Orthopedic Surgery: Yes (LEFT FT ALL TOES  AMP)


Other/Comment: Amputation of 3 toes right foot. Amputation of left foot toes.





- ANESTHESIA


Hx Anesthesia: Yes


Hx Anesthesia Reactions: No


Hx Malignant Hyperthermia: No





Meds


Allergies/Adverse Reactions: 


 Allergies











Allergy/AdvReac Type Severity Reaction Status Date / Time


 


Penicillins Allergy  RASH Verified 08/18/17 13:12














- Medications


Medications: 


 Current Medications





Acetaminophen (Tylenol 325mg Tab)  650 mg PO Q6H PRN


   PRN Reason: Fever >100.4 F


Insulin Human Regular 100 (units/ Sodium Chloride)  100 mls @ 10 mls/hr IV 

.Q10H PRN; Protocol; 10 UNITS/HR


   PRN Reason: TITRATE PER MD ORDER


   Last Admin: 08/18/17 12:02 Dose:  6 units/hr, 6 mls/hr


Sodium Chloride (Sodium Chloride 0.9%)  1,000 mls @ 100 mls/hr IV .Q10H MONTEZ


Aztreonam (Azactam 1 Gm)  100 mls @ 100 mls/hr IVPB Q8 MONTEZ


   PRN Reason: Protocol


   Stop: 08/18/17 22:59


   Last Admin: 08/18/17 15:04 Dose:  100 mls/hr


Levetiracetam 500 mg/ Sodium (Chloride)  105 mls @ 460 mls/hr IV Q12 MONTEZ


Vancomycin HCl (Vancomycin 1gm)  1 gm in 250 mls @ 167 mls/hr IVPB DAILY MONTEZ


   PRN Reason: Protocol


Nicardipine HCl (Cardene Iv Premix)  20 mg in 200 mls @ 50 mls/hr IV .Q4H PRN; 

Protocol; 5 MG/HR


   PRN Reason: TITRATE PER MD ORDER


Pantoprazole Sodium (Protonix Ec Tab)  40 mg PO 0600 MONTEZ











Physical Exam





- Constitutional


Appears: Non-toxic





- Head Exam


Head Exam: ATRAUMATIC, NORMAL INSPECTION, NORMOCEPHALIC





- Eye Exam


Pupil Exam: Irregular





- ENT Exam


ENT Exam: Mucous Membranes Moist, Normal Exam





- Neck Exam


Neck exam: Positive for: Normal Inspection





- Respiratory Exam


Respiratory Exam: Clear to Auscultation Bilateral, NORMAL BREATHING PATTERN





- Cardiovascular Exam


Cardiovascular Exam: REGULAR RHYTHM, +S1, +S2





- GI/Abdominal Exam


GI & Abdominal Exam: Normal Bowel Sounds, Soft.  absent: Tenderness





- Neurological Exam


Neurological exam: Alert, CN II-XII Intact, Oriented x3





- Expanded Neurological Exam


  ** Expanded


Patient oriented to: person, place, time


Cranial nerves: EOM's Intact: Normal, Facial Sensation: Normal


Ataxia: No


Cerebellar Function: Finger to Nose: Abnormal Left


Upper motor neuron: Babinski Sign: Abnormal Left


Sensory exam: Lower Extremity Light Touch: Abnormal Left, Upper Extremity Light 

Touch: Abnormal Left


Neuro motor strength exam: Left Upper Extremity: 4, Right Upper Extremity: 5, 

Left Lower Extremity: 4, Right Lower Extremity: 5


DTR: Achilles Tendon Left: 3+, Achilles Tendon Right: 2+, Bicep Left: 3+, Bicep 

Right: 2+, Patellar Left: 3+, Patellar Right: 2+





- Psychiatric Exam


Psychiatric exam: Normal Affect, Normal Mood





- Skin


Skin Exam: Dry, Intact, Normal Color, Warm





Results





- Vital Signs


Recent Vital Signs: 


 Last Vital Signs











Temp  97.7 F   08/18/17 14:27


 


Pulse  70   08/18/17 14:27


 


Resp  16   08/18/17 14:27


 


BP  170/86 H  08/18/17 14:27


 


Pulse Ox  100   08/18/17 12:07














- Labs


Result Diagrams: 


 08/18/17 10:36





 08/18/17 10:36


Labs: 


 Laboratory Results - last 24 hr











  08/18/17 08/18/17 08/18/17





  11:51 13:00 13:26


 


pO2   


 


VBG pH   


 


VBG pCO2   


 


VBG HCO3   


 


VBG Total CO2   


 


VBG O2 Sat (Calc)   


 


VBG Base Excess   


 


VBG Potassium   


 


Sodium   


 


Chloride   


 


Glucose   


 


Lactate   


 


FiO2   


 


POC Glucose (mg/dL)  411 H*   323 H


 


Venous Blood Potassium   


 


Urine Color   Yellow 


 


Urine Appearance   Clear 


 


Urine pH   6.0 


 


Ur Specific Gravity   1.025 


 


Urine Protein   100 H 


 


Urine Glucose (UA)   >=1000 


 


Urine Ketones   15 H 


 


Urine Blood   Moderate H 


 


Urine Nitrate   Negative 


 


Urine Bilirubin   Negative 


 


Urine Urobilinogen   0.2 


 


Ur Leukocyte Esterase   Negative 


 


Urine RBC   1 - 3 


 


Urine WBC   Negative 














  08/18/17 08/18/17





  14:00 14:50


 


pO2  39 


 


VBG pH  7.30 L 


 


VBG pCO2  30.0 L 


 


VBG HCO3  14.8 L 


 


VBG Total CO2  15.7 L 


 


VBG O2 Sat (Calc)  84.9 H 


 


VBG Base Excess  -10.4 L 


 


VBG Potassium  3.8 


 


Sodium  143.0 


 


Chloride  114.0 H 


 


Glucose  291 H 


 


Lactate  2.2 H 


 


FiO2  21.0 


 


POC Glucose (mg/dL)   256 H


 


Venous Blood Potassium  3.8 


 


Urine Color  


 


Urine Appearance  


 


Urine pH  


 


Ur Specific Gravity  


 


Urine Protein  


 


Urine Glucose (UA)  


 


Urine Ketones  


 


Urine Blood  


 


Urine Nitrate  


 


Urine Bilirubin  


 


Urine Urobilinogen  


 


Ur Leukocyte Esterase  


 


Urine RBC  


 


Urine WBC  














- Imaging and Cardiology


  ** CT scan - head


Status: Image reviewed by me, Report reviewed by me (ICH measuring about 56 mL 

in volume in the right frontal parietal region in the cortical and subcortical 

region, appears traumatic.)





Assessment & Plan


(1) Intraparenchymal hematoma of brain


Assessment and Plan: 


1. Admit to ICU and monitor with telemetry


2. Keep head of bed elevated to >30 degrees


3. Give Keppra 500 mg Q12 for 7 days


4. Avoid dextrose containing fluids, fever or hypertension


5. Keep SBP from 130-160 mm hg


6. Hold anti-platelet agents or anti-coagulation for the next 48 hours 


7. SCD for DVT Px


8. Obtain CTA of the head/neck


9. MRI of the brain with and without contrast when logistically possible





Thank you.


Status: Acute   Priority: High

## 2017-08-18 NOTE — CP.PCM.CON
History of Present Illness





- History of Present Illness


History of Present Illness: 


Patient is 61yo male with PMHx of NIDDM, HTN, toe amputations, presented to the 

ER with an unwitnessed fall. Patient is poor historian, stating he is not sure 

what happened. Pt was found on the floor by HHA this morning. Pt denies fever, 

chills, cough, chest pain, palpitations, dizziness. Endorses generalized 

headache. In the ER patient noted to be in mild DKA, CT head noted to have 

large R parietal ICH. No other constitutional symptoms. Rashad





PMHx: DM, HTN


PSHx: toe amputation


Allergies: PCN


FHx: NC


Meds: as per EMR





Review of Systems





- Review of Systems


Review of Systems: 





As per HPI





Past Patient History





- Infectious Disease


Hx of Infectious Diseases: None





- Tetanus Immunizations


Tetanus Immunization: Up to Date





- Past Medical History & Family History


Past Medical History?: Yes





- Past Social History


Smoking Status: Never Smoked





- CARDIAC


Hx Cardiac Disorders: Yes (CAD)


Hx Circulatory Problems: Yes


Hx Congestive Heart Failure: Yes


Hx Hypertension: Yes


Hx Pacemaker: No


Hx Peripheral Vascular Disease: Yes


Other/Comment: Stents x 6 in the heart.





- PULMONARY


Hx Respiratory Disorders: Yes


Hx Chronic Obstructive Pulmonary Disease (COPD): Yes


Hx Pneumonia: Yes





- NEUROLOGICAL


Hx Neurological Disorder: No





- HEENT


Hx HEENT Problems: Yes (BLURRY VISION TO LEFT EYE)


Hx Cataracts: Yes (Right eye)





- RENAL


Hx Chronic Kidney Disease: Yes


Hx Renal Failure: Yes





- ENDOCRINE/METABOLIC


Hx Endocrine Disorders: Yes


Hx Diabetes Mellitus Type 2: Yes





- HEMATOLOGICAL/ONCOLOGICAL


Hx Blood Disorders: Yes


Other/Comment: hx mrsa





- INTEGUMENTARY


Hx Dermatological Problems: Yes (SCARRING TO AMPUTATED RIGHT AND LEFT TOES.ALL 

TOES L.RIGHT 3 TOES.)





- MUSCULOSKELETAL/RHEUMATOLOGICAL


Hx Musculoskeletal Disorders: No


Hx Falls: No





- GASTROINTESTINAL


Hx Gastrointestinal Disorders: Yes


Hx Gall Bladder Disease: Yes





- GENITOURINARY/GYNECOLOGICAL


Hx Genitourinary Disorders: No





- PSYCHIATRIC


Hx Psychophysiologic Disorder: Yes


Hx Anxiety: Yes


Hx Depression: Yes


Hx Substance Use: No





- SURGICAL HISTORY


Hx Amputation: Yes (of toes on both feet)


Hx Cardiac Catheterization: Yes


Hx Cholecystectomy: Yes


Hx Coronary Stent: Yes (6)


Hx Gastric Bypass Surgery: No


Hx Orthopedic Surgery: Yes (LEFT FT ALL TOES  AMP)


Other/Comment: Amputation of 3 toes right foot. Amputation of left foot toes.





- ANESTHESIA


Hx Anesthesia: Yes


Hx Anesthesia Reactions: No


Hx Malignant Hyperthermia: No





Meds


Allergies/Adverse Reactions: 


 Allergies











Allergy/AdvReac Type Severity Reaction Status Date / Time


 


Penicillins Allergy  RASH Verified 08/18/17 13:12














- Medications


Medications: 


 Current Medications





Insulin Human Regular 100 (units/ Sodium Chloride)  100 mls @ 10 mls/hr IV 

.Q10H PRN; Protocol; 10 UNITS/HR


   PRN Reason: TITRATE PER MD ORDER


   Last Admin: 08/18/17 12:02 Dose:  6 units/hr, 6 mls/hr











Physical Exam





- Constitutional


Appears: Well


Additional comments: 





drowsy





- Head Exam


Head Exam: ATRAUMATIC, NORMAL INSPECTION, NORMOCEPHALIC





- Eye Exam


Pupil Exam: PERRL





- Neck Exam


Neck exam: Positive for: Full Rom





- Respiratory Exam


Respiratory Exam: Clear to Auscultation Bilateral, NORMAL BREATHING PATTERN





- Cardiovascular Exam


Cardiovascular Exam: RRR, +S1, +S2





- GI/Abdominal Exam


GI & Abdominal Exam: Normal Bowel Sounds, Soft





- Extremities Exam


Extremities exam: Positive for: full ROM, normal inspection





- Neurological Exam


Additional comments: 





AAOx3


RUE 5/5, RLE 5/5, LUE 3/5, LLE 4/5





- Psychiatric Exam


Psychiatric exam: Normal Affect





Results





- Vital Signs


Recent Vital Signs: 


 Last Vital Signs











Temp  99.7 F H  08/18/17 10:23


 


Pulse  92 H  08/18/17 12:07


 


Resp  16   08/18/17 12:07


 


BP  170/86 H  08/18/17 12:07


 


Pulse Ox  100   08/18/17 12:07














- Labs


Result Diagrams: 


 08/18/17 10:36





 08/18/17 10:36


Labs: 


 Laboratory Results - last 24 hr











  08/18/17 08/18/17





  11:51 13:00


 


POC Glucose (mg/dL)  411 H* 


 


Urine Color   Yellow


 


Urine Appearance   Clear


 


Urine pH   6.0


 


Ur Specific Gravity   1.025


 


Urine Protein   100 H


 


Urine Glucose (UA)   >=1000


 


Urine Ketones   15 H


 


Urine Blood   Moderate H


 


Urine Nitrate   Negative


 


Urine Bilirubin   Negative


 


Urine Urobilinogen   0.2


 


Ur Leukocyte Esterase   Negative














- Imaging and Cardiology


  ** CT scan - head


Status: Image reviewed by me, Report reviewed by me





  ** Chest x-ray


Status: Image reviewed by me, Pending





Assessment & Plan





- Assessment and Plan (Free Text)


Assessment: 





61yo male a/w DKA, ICH








ICH, R Parietal


DKA, Hyperglycemia


Dehydration


Leukocytosis


Anemia


Acute renal failure


DM


HTN








- currently afebrile, HD stable, comfortable, awake, answers appropriately


- on exam has mild LUE/LLE weakness


- CT head noted for R parietal ICH


- On labs has leukocytosis, ARF, AG Acidosis, with +ketones








Recommend





- Monitor, q1hr neuro checks


- neurology consult


- verify if patient takes ASA, may need Platelets


- Keep SBP<140, may require Cardene gtt


- Seizure prophylaxis


- Antipyretics PRN, glycemic control, Na>140


- ECHO


- Insulin drip, q1hr FS


- repeat BMP in 4 hours


- keep NPO


- speech swallow eval, PT eval


- check Phos, Mg


- IVF hydration


- check procalcitonin


- panculture, Urine CX


- check iron, TIBC, Folate, Retic count


- UA, Ulytes


- renal Sono


- BP control


- DVT ppx, SCDs


- GI PPx, Pepcid IV


- Monitor I/OS


- admit to MICU

## 2017-08-18 NOTE — RAD
PROCEDURE:  Pelvis single view



HISTORY:

fall



COMPARISON:





TECHNIQUE:

Single view portable



FINDINGS:

There is no fracture bony abnormality seen in the pelvis.



IMPRESSION:

Negative study

## 2017-08-18 NOTE — CT
PROCEDURE:  CT HEAD WITHOUT CONTRAST.



HISTORY:

syncope



COMPARISON:

09/13/2016 



TECHNIQUE:

Axial computed tomography images were obtained through the head/brain 

without intravenous contrast.  



Radiation dose:



Total exam DLP = 688 mGy-cm.



This CT exam was performed using one or more of the following dose 

reduction techniques: Automated exposure control, adjustment of the 

mA and/or kV according to patient size, and/or use of iterative 

reconstruction technique.



FINDINGS:



HEMORRHAGE:

There is a large hemorrhage in the right parietal lobe and posterior 

frontal lobe measuring 3.4 x 4.7 cm. There is a thin rim of 

surrounding edema. This probably represents a hemorrhagic infarct. 

There is no intraventricular hemorrhage or subarachnoid hemorrhage 

visualized. 



BRAIN:

Areas of hypodensity are also seen more anteriorly in the right 

frontal lobe making the findings more consistent with an acute 

infarct.  There is no midline shift



VENTRICLES:

Unremarkable. No hydrocephalus. 



CALVARIUM:

Unremarkable.



PARANASAL SINUSES:

Unremarkable as visualized. No significant inflammatory changes.



MASTOID AIR CELLS:

Unremarkable as visualized. No inflammatory changes.



OTHER FINDINGS:

Findings were discussed with Dr. Carter at 11:05 a.m.



IMPRESSION:

Probable hemorrhagic infarct right posterior frontal parietal lobe.  

Nonhemorrhagic infarct of the right anterior frontal lobe.  There is 

no significant mass effect and no midline shift

## 2017-08-18 NOTE — ED PDOC
Arrival/HPI





- General


Chief Complaint: Trauma


Time Seen by Provider: 08/18/17 10:02


Historian: Patient





- History of Present Illness


Narrative History of Present Illness (Text): 





08/18/17 10:24


Dawit Carpenter is a 62 year old male, with a history of hypertension and 

diabetes, presents to the emergency department s/p unwitnessed fall. Patient 

was found on the floor in kitchen by the home health aide today morning. 

Patient is poor historian and is unsure how he fell or how long he was on the 

floor. Patient unsure if he lost consciousness. In the emergency department, 

his blood sugar was noted to be elevated. Does not endorse any other 

complaints. Denies fever, chills, headache, chest pain, difficulty breathing, 

back pain, nausea, vomiting, diarrhea, urinary symptoms, or any other 

complaints at this time. no cough





08/18/17 17:08





Time/Duration: 1-3 hours


Symptom Onset: Sudden


Context: Home





Past Medical History





- Travel History


Have you recently traveled outside [place] w/in the past 3 mons?: Yes





- Infectious Disease


Hx of Infectious Diseases: None





- Tetanus Immunization


Tetanus Immunization: Up to Date





- Cardiac


Hx Cardiac Disorders: Yes (CAD)


Hx Circulatory Problems: Yes


Hx Congestive Heart Failure: Yes


Hx Hypertension: Yes


Hx Pacemaker: No


Hx Peripheral Vascular Disease: Yes


Other/Comment: Stents x 6 in the heart.





- Pulmonary


Hx Respiratory Disorders: Yes


Hx Chronic Obstructive Pulmonary Disease (COPD): Yes


Hx Pneumonia: Yes





- Neurological


Hx Neurological Disorder: No





- HEENT


Hx HEENT Disorder: Yes (BLURRY VISION TO LEFT EYE)


Hx Cataracts: Yes (Right eye)





- Renal


Hx Renal Disorder: Yes


Hx Renal Failure: Yes





- Endocrine/Metabolic


Hx Endocrine Disorders: Yes


Hx Diabetes Mellitus Type 2: Yes





- Hematological/Oncological


Hx Blood Disorders: Yes


Other/Comment: hx mrsa





- Integumentary


Hx Dermatological Disorder: Yes (SCARRING TO AMPUTATED RIGHT AND LEFT TOES.ALL 

TOES L.RIGHT 3 TOES.)





- Musculoskeletal/Rheumatological


Hx Musculoskeletal Disorders: No


Hx Falls: No





- Gastrointestinal


Hx Gastrointestinal Disorders: Yes


Hx Gall Bladder Disease: Yes





- Genitourinary/Gynecological


Hx Genitourinary Disorders: No





- Psychiatric


Hx Psychophysiologic Disorder: Yes


Hx Anxiety: Yes


Hx Depression: Yes


Hx Substance Use: No





- Surgical History


Hx Amputation: Yes (of toes on both feet)


Hx Cardiac Catheterization: Yes


Hx Cholecystectomy: Yes


Hx Coronary Stent: Yes (6)


Hx Gastric Bypass Surgery: No


Hx Orthopedic Surgery: Yes (LEFT FT ALL TOES  AMP)


Other/Comment: Amputation of 3 toes right foot. Amputation of left foot toes.





- Anesthesia


Hx Anesthesia: Yes


Hx Anesthesia Reactions: No


Hx Malignant Hyperthermia: No





- Suicidal Assessment


Feels Threatened In Home Enviroment: No





Family/Social History





- Physician Review


Nursing Documentation Reviewed: Yes


Family/Social History: No Known Family HX


Smoking Status: Never Smoked


Hx Alcohol Use: No


Hx Substance Use: No


Hx Substance Use Treatment: No





Allergies/Home Meds


Allergies/Adverse Reactions: 


Allergies





Penicillins Allergy (Verified 08/18/17 13:12)


 RASH








Home Medications: 


 Home Meds











 Medication  Instructions  Recorded  Confirmed


 


Aspirin [Ecotrin] 81 mg PO DAILY 08/18/17 08/18/17


 


Atorvastatin [Lipitor] 40 mg PO DAILY 08/18/17 08/18/17


 


Cholecalciferol [Vitamin D 1000 IU] 1 tab PO WED 08/18/17 08/18/17


 


Glipizide [Glipizide Xl] 5 mg PO DAILY 08/18/17 08/18/17


 


Mv,Min10/Folic Acid/D3/Ala/Lut 1 tab PO DAILY 08/18/17 08/18/17





[Strovite One Caplet]   


 


SITagliptin [Januvia] 100 mg PO DAILY 08/18/17 08/18/17


 


amLODIPine [Norvasc] 10 mg PO DAILY 08/18/17 08/18/17














Review of Systems





- Physician Review


All systems were reviewed & negative as marked: Yes





- Review of Systems


Constitutional: Normal.  absent: Fatigue, Fevers


Respiratory: Normal.  absent: SOB, Cough, Sputum


Cardiovascular: Syncope (? syncope and fall ).  absent: Chest Pain, Palpitations


Gastrointestinal: Normal.  absent: Abdominal Pain, Diarrhea, Nausea, Vomiting


Neurological: Normal.  absent: Headache, Dizziness, Focal Weakness





Physical Exam


Vital Signs Reviewed: Yes


Vital Signs











  Temp Pulse Resp BP Pulse Ox


 


 08/18/17 12:07   92 H  16  170/86 H  100


 


 08/18/17 10:55   75  16  141/66  100


 


 08/18/17 10:23  99.7 F H    


 


 08/18/17 10:18   75  17  165/109 H  100











Temperature: Afebrile


Blood Pressure: Hypertensive


Pulse: Regular


Respiratory Rate: Normal


Appearance: Positive for: Non-Toxic, Other (morbidly obese )


Pain Distress: None


Mental Status: Positive for: Alert and Oriented X 3


Finger Stick Blood Glucose: 362





- Systems Exam


Head: Present: Atraumatic, Normocephalic


Pupils: Present: PERRL


Conjunctiva: Present: Normal


Mouth: Present: Moist Mucous Membranes


Respiratory/Chest: Present: Clear to Auscultation, Good Air Exchange.  No: 

Respiratory Distress, Accessory Muscle Use


Cardiovascular: Present: Regular Rate and Rhythm, Normal S1, S2.  No: Murmurs


Abdomen: Present: Normal Bowel Sounds.  No: Tenderness, Distention, Peritoneal 

Signs


Upper Extremity: Present: Normal Inspection.  No: Cyanosis, Edema


Lower Extremity: Present: Normal Inspection.  No: Edema


Neurological: Present: GCS=15, CN II-XII Intact, Speech Normal, Motor Func 

Grossly Intact, Normal Sensory Function


Skin: Present: Warm, Dry, Normal Color.  No: Rashes


Psychiatric: Present: Alert, Oriented x 3, Normal Insight, Normal Concentration





Medical Decision Making


ED Course and Treatment: 





08/18/17 10:30


Impression: A 62 year old male who presents to the emergency department for 

evaluation following an unwitnessed fall and ?syncope. Currently is patient 

awake, alert, oriented X3 and does not endorse any complaints. 





Plan:


-- CT Head


-- EKG


-- Labs, cardiac enzymes


-- Chest X-ray


-- IV Fluids


-- Urinalysis


-- Reassess and disposition





Progress Notes:


08/18/17 10:38


EKG interpreted by me:


NSR @ 79 bpm. Nonspecific T wave changes. No interval changes. 





08/18/17 11:10


CT Head reviewed:


IMPRESSION:


Probable hemorrhagic infarct right posterior frontal parietal lobe.  

Nonhemorrhagic infarct of the right anterior frontal lobe.  There is no 

significant mass effect and no midline shift





08/18/17 11:26


Case discussed with , neurosurgeon, who recommends that surgical 

intervention is not necessary at this time. Intensivist will evaluate patient 

for admission to ICU. 





08/18/17 11:44


Patient evaluated by intensivist. Accepts to ICU under Dr. Briscoe's service for 

DKA and intracranial bleed. 











- Critical Care


Critical Care Minutes: 45 minutes





- Lab Interpretations


Lab Results: 








 08/18/17 10:36 





 08/18/17 10:36 





 Lab Results





08/18/17 10:36: Sodium 142, Chloride 110 H, Potassium 4.7, Carbon Dioxide 12 L, 

Anion Gap 25 H, BUN 42 H, Creatinine 2.7 H, Est GFR ( Amer) 29, Est GFR (

Non-Af Amer) 24, Random Glucose 379 H* D, Calcium 9.8, Magnesium 1.9, Total 

Bilirubin 0.7, AST 37, ALT 29, Alkaline Phosphatase 124, Lactate Dehydrogenase 

667, Total Creatine Kinase 333 H, CK-MB (CK-2) 5.5 H, CK-MB (CK-2) % 1.7 L, 

Troponin I 0.36 H* D, Total Protein 8.1, Albumin 4.8, Globulin 3.3, Albumin/

Globulin Ratio 1.5


08/18/17 10:36: PT 11.4, INR 1.06, APTT 23.1 L


08/18/17 10:36: WBC 15.4 H D, RBC 4.05, Hgb 11.8 L, Hct 32.7 L, MCV 80.7, MCH 

29.1, MCHC 36.1, RDW 14.6 H, Plt Count 307, MPV 10.7, Gran % 91.1 H, Lymph % (

Auto) 4.8 L, Mono % (Auto) 4.0, Eos % (Auto) 0.0 L, Baso % (Auto) 0.1, Gran # 

14.03 H, Lymph # 0.7 L, Mono # 0.6, Eos # 0.0, Baso # 0.01, Neutrophils % (

Manual) 90 H, Lymphocytes % (Manual) 5 L, Monocytes % (Manual) 5, Platelet 

Evaluation Normal, Hypochromasia Slight, Anisocytosis (manual) 1+, Microcytosis 

(manual) 1+


08/18/17 10:36: pO2 53, VBG pH 7.23 L, VBG pCO2 33.0 L, VBG HCO3 13.8 L, VBG 

Total CO2 14.8 L, VBG O2 Sat (Calc) 92.7 H, VBG Base Excess -12.6 L, VBG 

Potassium 4.9, Sodium 140.0, Chloride 107.0, Glucose 393 H, Lactate 2.0, FiO2 

21.0, Venous Blood Potassium 4.9


08/18/17 10:11: POC Glucose (mg/dL) 362 H








I have reviewed the lab results: Yes





- RAD Interpretation


Radiology Orders: 








08/18/17 10:21


CHEST PORTABLE [RAD] Stat 





08/18/17 10:22


HEAD W/O CONTRAST [CT] Stat 


PELVIS ONE VIEW [RAD] Stat 











: Radiologist





- EKG Interpretation


Interpreted by ED Physician: Yes


Type: 12 lead EKG





- Medication Orders


Current Medication Orders: 








Acetaminophen (Tylenol 325mg Tab)  650 mg PO Q6H PRN


   PRN Reason: Fever >100.4 F


   Last Admin: 08/18/17 15:33  Dose: 650 mg





Insulin Human Regular 100 (units/ Sodium Chloride)  100 mls @ 10 mls/hr IV 

.Q10H PRN; Protocol; 10 UNITS/HR


   PRN Reason: TITRATE PER MD ORDER


   Last Titration: 08/18/17 16:53  Dose: 2 units/hr, 2 mls/hr





Sodium Chloride (Sodium Chloride 0.9%)  1,000 mls @ 100 mls/hr IV .Q10H MONTEZ


Aztreonam (Azactam 1 Gm)  100 mls @ 100 mls/hr IVPB Q8 MONTEZ


   PRN Reason: Protocol


   Stop: 08/18/17 22:59


   Last Admin: 08/18/17 15:04  Dose: 100 mls/hr





Levetiracetam 500 mg/ Sodium (Chloride)  105 mls @ 460 mls/hr IV Q12 MONTEZ


   Last Admin: 08/18/17 15:26  Dose: 460 mls/hr





Vancomycin HCl (Vancomycin 1gm)  1 gm in 250 mls @ 167 mls/hr IVPB DAILY MONTEZ


   PRN Reason: Protocol


   Last Admin: 08/18/17 16:05  Dose: 167 mls/hr





Nicardipine HCl (Cardene Iv Premix)  20 mg in 200 mls @ 50 mls/hr IV .Q4H PRN; 

Protocol; 5 MG/HR


   PRN Reason: TITRATE PER MD ORDER


Pantoprazole Sodium (Protonix Ec Tab)  40 mg PO 0600 MONTEZ





Discontinued Medications





Sodium Chloride (Sodium Chloride 0.9%)  500 mls @ 999 mls/hr IV .Q31M STA


   Stop: 08/18/17 10:52


   Last Admin: 08/18/17 10:54  Dose: 999 mls/hr





Ciprofloxacin (Cipro 400mg/200ml Dsw)  400 mg in 200 mls @ 133.3 mls/hr IVPB 

STAT STA


   PRN Reason: Protocol


   Stop: 08/18/17 12:25


   Last Admin: 08/18/17 11:27  Dose: 133.3 mls/hr





Vancomycin HCl (Vancomycin 1gm)  1 gm in 250 mls @ 167 mls/hr IVPB STAT STA


   PRN Reason: Protocol


   Stop: 08/18/17 12:24


   Last Admin: 08/18/17 10:30  Dose: 167 mls/hr





Sodium Chloride (Sodium Chloride 0.9%)  1,000 mls @ 999 mls/hr IV .Q1H1M STA


   Stop: 08/18/17 12:42


   Last Admin: 08/18/17 12:01  Dose: 999 mls/hr





Pneumococcal Polyvalent Vaccine (Pneumovax 23 Vaccine)  0.5 ml IM .ONCE ONE


   Stop: 08/18/17 15:03











- Scribe Statement


The provider has reviewed the documentation as recorded by the Tonioibe


Sherry Koehler 


Provider Attestation: 





Provider Scribe Attestation:


All medical record entries made by the Scribe were at my direction and 

personally dictated by me. I have reviewed the chart and agree that the record 

accurately reflects my personal performance of the history, physical exam, 

medical decision making, and the department course for this patient. I have 

also personally directed, reviewed, and agree with the discharge instructions 

and disposition.








Disposition/Present on Arrival





- Present on Arrival


Any Indicators Present on Arrival: No


History of DVT/PE: No


History of Uncontrolled Diabetes: No


Urinary Catheter: No


History of Decub. Ulcer: No


History Surgical Site Infection Following: None





- Disposition


Have Diagnosis and Disposition been Completed?: Yes


Diagnosis: 


 Leukocytosis, Intracranial bleed, DKA (diabetic ketoacidoses), NSTEMI (non-ST 

elevated myocardial infarction)





Disposition: HOSPITALIZED


Disposition Time: 11:00


Patient Problems: 


 Current Active Problems











Problem Status Onset


 


Cellulitis Acute  


 


Intracranial bleed Acute  


 


Intraparenchymal hematoma of brain Acute  


 


Leukocytosis Acute  











Condition: CRITICAL

## 2017-08-18 NOTE — CP.PCM.HP
History of Present Illness





- History of Present Illness


History of Present Illness: 








08/18/17 


Dawit Carpenter is a 62 year old male, with a history of hypertension and 

diabetes, presents to the emergency department s/p unwitnessed fall. Patient 

was found on the floor in kitchen by the home health aide today morning. 

Patient is poor historian and is unsure how he fell or how long he was on the 

floor. Patient unsure if he lost consciousness. In the emergency department, 

his blood sugar was noted to be elevated. Does not endorse any other 

complaints. Denies fever, chills, headache, chest pain, difficulty breathing, 

back pain, nausea, vomiting, diarrhea, urinary symptoms, or any other 

complaints at this time. no cough








Present on Admission





- Present on Admission


Any Indicators Present on Admission: No





Past Patient History





- Infectious Disease


Hx of Infectious Diseases: None





- Tetanus Immunizations


Tetanus Immunization: Up to Date





- Past Medical History & Family History


Past Medical History?: Yes





- Past Social History


Smoking Status: Never Smoked


Drugs: Prescription medications





- CARDIAC


Hx Cardiac Disorders: Yes (CAD)


Hx Circulatory Problems: Yes


Hx Congestive Heart Failure: Yes


Hx Hypertension: Yes


Hx Pacemaker: No


Hx Peripheral Vascular Disease: Yes


Other/Comment: Stents x 6 in the heart.





- PULMONARY


Hx Respiratory Disorders: Yes


Hx Chronic Obstructive Pulmonary Disease (COPD): Yes


Hx Pneumonia: Yes





- NEUROLOGICAL


Hx Neurological Disorder: No





- HEENT


Hx HEENT Problems: Yes (BLURRY VISION TO LEFT EYE)


Hx Cataracts: Yes (Right eye)





- RENAL


Hx Chronic Kidney Disease: Yes


Hx Renal Failure: Yes





- ENDOCRINE/METABOLIC


Hx Endocrine Disorders: Yes


Hx Diabetes Mellitus Type 2: Yes





- HEMATOLOGICAL/ONCOLOGICAL


Hx Blood Disorders: Yes


Other/Comment: hx mrsa





- INTEGUMENTARY


Hx Dermatological Problems: Yes (SCARRING TO AMPUTATED RIGHT AND LEFT TOES.ALL 

TOES L.RIGHT 3 TOES.)





- MUSCULOSKELETAL/RHEUMATOLOGICAL


Hx Musculoskeletal Disorders: No


Hx Falls: No





- GASTROINTESTINAL


Hx Gastrointestinal Disorders: Yes


Hx Gall Bladder Disease: Yes





- GENITOURINARY/GYNECOLOGICAL


Hx Genitourinary Disorders: No





- PSYCHIATRIC


Hx Psychophysiologic Disorder: Yes


Hx Anxiety: Yes


Hx Depression: Yes


Hx Substance Use: No





- SURGICAL HISTORY


Hx Amputation: Yes (of toes on both feet)


Hx Cardiac Catheterization: Yes


Hx Cholecystectomy: Yes


Hx Coronary Stent: Yes (6)


Hx Gastric Bypass Surgery: No


Hx Orthopedic Surgery: Yes (LEFT FT ALL TOES  AMP)


Other/Comment: Amputation of 3 toes right foot. Amputation of left foot toes.





- ANESTHESIA


Hx Anesthesia: Yes


Hx Anesthesia Reactions: No


Hx Malignant Hyperthermia: No





Meds


Allergies/Adverse Reactions: 


 Allergies











Allergy/AdvReac Type Severity Reaction Status Date / Time


 


Penicillins Allergy  RASH Verified 08/18/17 13:12














Physical Exam





- Constitutional


Appears: Well





- Head Exam


Head Exam: NORMAL INSPECTION, NORMOCEPHALIC





- Eye Exam


Eye Exam: EOMI, Normal appearance





- ENT Exam


ENT Exam: Mucous Membranes Moist, Normal Exam





- Neck Exam


Neck exam: Positive for: Normal Inspection





- Cardiovascular Exam


Cardiovascular Exam: REGULAR RHYTHM





- GI/Abdominal Exam


GI & Abdominal Exam: Normal Bowel Sounds, Soft.  absent: Tenderness





- Rectal Exam


Rectal Exam: NORMAL INSPECTION





-  Exam


 Exam: Circumcision, NORMAL INSPECTION


External exam: NORMAL EXTERNAL EXAM


Speculum exam: NORMAL SPECULUM EXAM


Bimanual exam: NORMAL BIMANUAL EXAM





- Extremities Exam


Extremities exam: Positive for: normal inspection





- Back Exam


Back exam: NORMAL INSPECTION





- Psychiatric Exam


Psychiatric exam: Normal Affect, Normal Mood





- Skin


Skin Exam: Dry, Intact, Normal Color, Warm





Results





- Vital Signs


Recent Vital Signs: 





 Last Vital Signs











Temp  98.1 F   08/18/17 20:23


 


Pulse  59 L  08/18/17 20:30


 


Resp  16   08/18/17 15:51


 


BP  122/57 L  08/18/17 20:21


 


Pulse Ox  100   08/18/17 20:30














- Labs


Result Diagrams: 


 08/18/17 10:36





 08/18/17 16:31


Labs: 





 Laboratory Results - last 24 hr











  08/18/17 08/18/17 08/18/17





  11:51 13:00 13:26


 


pO2   


 


VBG pH   


 


VBG pCO2   


 


VBG HCO3   


 


VBG Total CO2   


 


VBG O2 Sat (Calc)   


 


VBG Base Excess   


 


VBG Potassium   


 


Sodium   


 


Chloride   


 


Glucose   


 


Lactate   


 


FiO2   


 


Potassium   


 


Carbon Dioxide   


 


Anion Gap   


 


BUN   


 


Creatinine   


 


Est GFR ( Amer)   


 


Est GFR (Non-Af Amer)   


 


POC Glucose (mg/dL)  411 H*   323 H


 


Random Glucose   


 


Calcium   


 


Phosphorus   


 


Magnesium   


 


Iron   


 


TIBC   


 


% Saturation   


 


Troponin I   


 


Venous Blood Potassium   


 


Urine Color   Yellow 


 


Urine Appearance   Clear 


 


Urine pH   6.0 


 


Ur Specific Gravity   1.025 


 


Urine Protein   100 H 


 


Urine Glucose (UA)   >=1000 


 


Urine Ketones   15 H 


 


Urine Blood   Moderate H 


 


Urine Nitrate   Negative 


 


Urine Bilirubin   Negative 


 


Urine Urobilinogen   0.2 


 


Ur Leukocyte Esterase   Negative 


 


Urine RBC   1 - 3 


 


Urine WBC   Negative 














  08/18/17 08/18/17 08/18/17





  14:00 14:50 16:05


 


pO2  39  


 


VBG pH  7.30 L  


 


VBG pCO2  30.0 L  


 


VBG HCO3  14.8 L  


 


VBG Total CO2  15.7 L  


 


VBG O2 Sat (Calc)  84.9 H  


 


VBG Base Excess  -10.4 L  


 


VBG Potassium  3.8  


 


Sodium  143.0  


 


Chloride  114.0 H  


 


Glucose  291 H  


 


Lactate  2.2 H  


 


FiO2  21.0  


 


Potassium   


 


Carbon Dioxide   


 


Anion Gap   


 


BUN   


 


Creatinine   


 


Est GFR ( Amer)   


 


Est GFR (Non-Af Amer)   


 


POC Glucose (mg/dL)   256 H  179 H


 


Random Glucose   


 


Calcium   


 


Phosphorus   


 


Magnesium   


 


Iron   


 


TIBC   


 


% Saturation   


 


Troponin I   


 


Venous Blood Potassium  3.8  


 


Urine Color   


 


Urine Appearance   


 


Urine pH   


 


Ur Specific Gravity   


 


Urine Protein   


 


Urine Glucose (UA)   


 


Urine Ketones   


 


Urine Blood   


 


Urine Nitrate   


 


Urine Bilirubin   


 


Urine Urobilinogen   


 


Ur Leukocyte Esterase   


 


Urine RBC   


 


Urine WBC   














  08/18/17 08/18/17 08/18/17





  16:31 16:31 16:49


 


pO2   


 


VBG pH   


 


VBG pCO2   


 


VBG HCO3   


 


VBG Total CO2   


 


VBG O2 Sat (Calc)   


 


VBG Base Excess   


 


VBG Potassium   


 


Sodium  145  


 


Chloride  115 H  


 


Glucose   


 


Lactate   


 


FiO2   


 


Potassium  4.1  


 


Carbon Dioxide  15 L  


 


Anion Gap  19  


 


BUN  39 H  


 


Creatinine  2.5 H  


 


Est GFR ( Amer)  32  


 


Est GFR (Non-Af Amer)  26  


 


POC Glucose (mg/dL)    195 H


 


Random Glucose  165 H  


 


Calcium  9.5  


 


Phosphorus  1.9 L  


 


Magnesium  1.8  


 


Iron   56 


 


TIBC   281 


 


% Saturation   20 


 


Troponin I  0.75 H* D  


 


Venous Blood Potassium   


 


Urine Color   


 


Urine Appearance   


 


Urine pH   


 


Ur Specific Gravity   


 


Urine Protein   


 


Urine Glucose (UA)   


 


Urine Ketones   


 


Urine Blood   


 


Urine Nitrate   


 


Urine Bilirubin   


 


Urine Urobilinogen   


 


Ur Leukocyte Esterase   


 


Urine RBC   


 


Urine WBC   














  08/18/17 08/18/17





  17:56 19:31


 


pO2  


 


VBG pH  


 


VBG pCO2  


 


VBG HCO3  


 


VBG Total CO2  


 


VBG O2 Sat (Calc)  


 


VBG Base Excess  


 


VBG Potassium  


 


Sodium  


 


Chloride  


 


Glucose  


 


Lactate  


 


FiO2  


 


Potassium  


 


Carbon Dioxide  


 


Anion Gap  


 


BUN  


 


Creatinine  


 


Est GFR ( Amer)  


 


Est GFR (Non-Af Amer)  


 


POC Glucose (mg/dL)  173 H  173 H


 


Random Glucose  


 


Calcium  


 


Phosphorus  


 


Magnesium  


 


Iron  


 


TIBC  


 


% Saturation  


 


Troponin I  


 


Venous Blood Potassium  


 


Urine Color  


 


Urine Appearance  


 


Urine pH  


 


Ur Specific Gravity  


 


Urine Protein  


 


Urine Glucose (UA)  


 


Urine Ketones  


 


Urine Blood  


 


Urine Nitrate  


 


Urine Bilirubin  


 


Urine Urobilinogen  


 


Ur Leukocyte Esterase  


 


Urine RBC  


 


Urine WBC  














Assessment & Plan





- Assessment and Plan (Free Text)


Assessment: 





63yo male a/w DKA, ICH








ICH, R Parietal


DKA, Hyperglycemia


Dehydration


Leukocytosis


Anemia


Acute renal failure


DM


HTN








- currently afebrile, HD stable, comfortable, awake, answers appropriately


- on exam has mild LUE/LLE weakness


- CT head noted for R parietal ICH


- On labs has leukocytosis, ARF, AG Acidosis, with +ketones


 Intraparenchymal hematoma of brain


Assessment and Plan: 


1. Admit to ICU and monitor with telemetry


2. Keep head of bed elevated to >30 degrees


3. Give Keppra 500 mg Q12 for 7 days


4. Avoid dextrose containing fluids, fever or hypertension


5. Keep SBP from 130-160 mm hg


6. Hold anti-platelet agents or anti-coagulation for the next 48 hours 


7. SCD for DVT Px


8. Obtain CTA of the head/neck


9. MRI of the brain with and without contrast when logistically possible

## 2017-08-18 NOTE — CARD
--------------- APPROVED REPORT --------------





EKG Measurement

Heart [id number]

CT [id number]

[id number]

[id number]

[id number]



<Conclusion>

Normal sinus rhythm

Left axis deviation

Septal infarct, age undetermined

Abnormal ECG

## 2017-08-19 LAB
ALBUMIN/GLOB SERPL: 1.3 {RATIO} (ref 1.1–1.8)
ALP SERPL-CCNC: 101 U/L (ref 38–133)
ALT SERPL-CCNC: 33 U/L (ref 7–56)
AST SERPL-CCNC: 48 U/L (ref 15–59)
BASOPHILS # BLD AUTO: 0.12 K/MM3 (ref 0–2)
BASOPHILS NFR BLD: 0.7 % (ref 0–3)
BILIRUB SERPL-MCNC: 0.5 MG/DL (ref 0.2–1.3)
BUN SERPL-MCNC: 34 MG/DL (ref 7–21)
CALCIUM SERPL-MCNC: 9.2 MG/DL (ref 8.4–10.5)
CHLORIDE SERPL-SCNC: 119 MMOL/L (ref 98–107)
CO2 SERPL-SCNC: 15 MMOL/L (ref 21–33)
EOSINOPHIL # BLD: 0.1 10*3/UL (ref 0–0.7)
EOSINOPHIL NFR BLD: 0.3 % (ref 1.5–5)
ERYTHROCYTE [DISTWIDTH] IN BLOOD BY AUTOMATED COUNT: 15 % (ref 11.5–14.5)
FOLATE SERPL-MCNC: 16.8 NG/ML
GLOBULIN SER-MCNC: 2.9 GM/DL
GLUCOSE SERPL-MCNC: 132 MG/DL (ref 70–110)
GRANULOCYTES # BLD: 13.07 10*3/UL (ref 1.4–6.5)
GRANULOCYTES NFR BLD: 80.5 % (ref 50–68)
HCT VFR BLD CALC: 30.3 % (ref 42–52)
LYMPHOCYTES # BLD: 1.6 10*3/UL (ref 1.2–3.4)
LYMPHOCYTES NFR BLD AUTO: 9.7 % (ref 22–35)
MAGNESIUM SERPL-MCNC: 1.8 MG/DL (ref 1.7–2.2)
MCH RBC QN AUTO: 29.3 PG (ref 25–35)
MCHC RBC AUTO-ENTMCNC: 36 G/DL (ref 31–37)
MCV RBC AUTO: 81.5 FL (ref 80–105)
MONOCYTES # BLD AUTO: 1.4 10*3/UL (ref 0.1–0.6)
MONOCYTES NFR BLD: 8.8 % (ref 1–6)
PHOSPHATE SERPL-MCNC: 2.9 MG/DL (ref 2.5–4.5)
PLATELET # BLD: 291 10^3/UL (ref 120–450)
PMV BLD AUTO: 11.4 FL (ref 7–11)
POTASSIUM SERPL-SCNC: 4.2 MMOL/L (ref 3.6–5)
PROT SERPL-MCNC: 6.8 G/DL (ref 5.8–8.3)
SODIUM SERPL-SCNC: 146 MMOL/L (ref 132–148)
TROPONIN I SERPL-MCNC: 0.39 NG/ML
WBC # BLD AUTO: 16.2 10^3/UL (ref 4.5–11)

## 2017-08-19 RX ADMIN — INSULIN DETEMIR SCH UNIT: 100 INJECTION, SOLUTION SUBCUTANEOUS at 09:20

## 2017-08-19 RX ADMIN — NITROGLYCERIN SCH EA: 20 OINTMENT TOPICAL at 13:49

## 2017-08-19 RX ADMIN — INSULIN DETEMIR SCH UNIT: 100 INJECTION, SOLUTION SUBCUTANEOUS at 06:49

## 2017-08-19 RX ADMIN — INSULIN HUMAN SCH: 100 INJECTION, SOLUTION PARENTERAL at 18:07

## 2017-08-19 RX ADMIN — VANCOMYCIN HYDROCHLORIDE SCH MLS/HR: 1 INJECTION, POWDER, LYOPHILIZED, FOR SOLUTION INTRAVENOUS at 09:19

## 2017-08-19 RX ADMIN — LEVETIRACETAM SCH MLS/HR: 5 INJECTION INTRAVENOUS at 21:13

## 2017-08-19 RX ADMIN — PANTOPRAZOLE SODIUM SCH: 40 TABLET, DELAYED RELEASE ORAL at 06:17

## 2017-08-19 RX ADMIN — NITROGLYCERIN SCH EA: 20 OINTMENT TOPICAL at 19:00

## 2017-08-19 RX ADMIN — INSULIN HUMAN SCH UNITS: 100 INJECTION, SOLUTION PARENTERAL at 11:53

## 2017-08-19 NOTE — PN
DATE:  08/19/2017



INTENSIVIST NOTE



SUBJECTIVE:  The patient is resting in bed, awake and alert, answering all

questions appropriately.  He has O2 via nasal cannula, very comfortable

with his respiratory status.  The patient is still on Cardene for blood

pressure control with no acute distress, no complaints of pain.  Still has

some mild weakness in the left upper extremity.



PHYSICAL EXAMINATION:

VITAL SIGNS:  Temperature is 98.4, his pulse is 62, respirations are 17 and

BP is 125/47.

SKIN:  Warm and dry.

HEENT:  Head is atraumatic and normocephalic.  Eyes are reactive to light. 

Ears, nose, and throat seem to be within normal limits.

NECK:  Supple.  No JVD.  No thyroid enlargement.  No lymph nodes.

HEART:  Has a regular rate and rhythm.  Normal S1 and S2.

LUNGS:  Reveal good breath sounds bilaterally.

ABDOMEN:  Soft and nontender.  Decreased bowel sounds.

GENITALIA:  Deferred.

RECTAL:  Deferred.

MUSCULOSKELETAL:  No joint deformities.

EXTREMITIES:  Reveal trace lower extremity edema.

NEUROLOGIC:  The patient does have some slight weakness in the left upper

extremity.



LABORATORY DATA:  His white count is 16.2, hemoglobin is 10.9, hematocrit

30.3 with platelets of 291,000.  Sodium is 146, potassium 4.2, chloride

119, CO2 of 15 with a BUN of 34 and a creatinine of 2.2, glucose of 132.



IMPRESSION:  This patient presented with cerebrovascular accident, having

history of falling at home.  The patient also is noted to have diabetic

ketoacidosis and has a history of diabetes as well as hypertension.



PLAN:  We will continue with O2 via nasal cannula.  The patient is

continuing with the Cardene drip.  We will continue to evaluate neuro

checks and follow the patient's blood sugars and treat as necessary.  He is

getting Keppra as well as Levemir and Protonix.  We will correct labs as

needed.  We will continue to treat aggressively and follow closely along

with the other consultants and the primary care doctor.





__________________________________________

Haresh Cottrell MD



DD:  08/19/2017 8:01:08

DT:  08/19/2017 9:18:25

Job # [id number]

## 2017-08-19 NOTE — CP.PCM.PN
Subjective





- Date & Time of Evaluation


Date of Evaluation: 08/19/17


Time of Evaluation: 11:56





- Subjective


Subjective: 





Mr. Carpenter was seen and examined today at bedside in the ICU.  He was awake

, alert and responsive.  He did not have any complaints, but continued to have 

left side weakness. 





Objective





- Vital Signs/Intake and Output


Vital Signs (last 24 hours): 


 











Temp Pulse Resp BP Pulse Ox


 


 98.3 F   78   11 L  142/68   100 


 


 08/19/17 08:00  08/19/17 11:20  08/19/17 07:10  08/19/17 10:48  08/19/17 11:20








Intake and Output: 


 











 08/19/17 08/19/17





 06:59 18:59


 


Intake Total 1426 


 


Balance 1426 














- Medications


Medications: 


 Current Medications





Acetaminophen (Tylenol 325mg Tab)  650 mg PO Q6H PRN


   PRN Reason: Fever >100.4 F


   Last Admin: 08/19/17 11:14 Dose:  650 mg


Sodium Chloride (Sodium Chloride 0.9%)  1,000 mls @ 100 mls/hr IV .Q10H [facility]


   Last Admin: 08/19/17 04:51 Dose:  100 mls/hr


Levetiracetam 500 mg/ Sodium (Chloride)  105 mls @ 460 mls/hr IV Q12 [facility]


   Last Admin: 08/19/17 09:20 Dose:  460 mls/hr


Vancomycin HCl (Vancomycin 1gm)  1 gm in 250 mls @ 167 mls/hr IVPB DAILY [facility]


   PRN Reason: Protocol


   Last Admin: 08/19/17 09:19 Dose:  167 mls/hr


Nicardipine HCl (Cardene Iv Premix)  20 mg in 200 mls @ 50 mls/hr IV .Q4H PRN; 

Protocol; 5 MG/HR


   PRN Reason: TITRATE PER MD ORDER


Insulin Detemir (Levemir)  15 unit SC DAILY [facility]


   Last Admin: 08/19/17 09:20 Dose:  15 unit


Insulin Human Regular (Humulin R Med)  0 units SC Q6 [facility]


   PRN Reason: Protocol


   Last Admin: 08/19/17 11:53 Dose:  1 units


Pantoprazole Sodium (Protonix Ec Tab)  40 mg PO 0600 [facility]


   Last Admin: 08/19/17 06:17 Dose:  Not Given











- Labs


Labs: 


 





 08/19/17 05:30 





 08/19/17 05:30 





 











PT  11.4 Seconds (9.9-11.8)   08/18/17  10:36    


 


INR  1.06  (0.93-1.08)   08/18/17  10:36    


 


APTT  23.1 Seconds (23.7-30.8)  L  08/18/17  10:36    














- Neurological Exam


Additional comments: 





Neurologically unchanged compared with yesterday's examination. 





Assessment and Plan


(1) Intraparenchymal hematoma of brain


Assessment & Plan: 


Continue current management for BP control. Avoid fever, hyperglycemia, and 

hyopnatremic.  Continue seizure prophylaxis.  Obtain follow-up CT head without 

contrast today.


Status: Acute

## 2017-08-19 NOTE — CT
PROCEDURE:  CT HEAD WITHOUT CONTRAST.



HISTORY:

follow bleed



COMPARISON:

8/18/2017 



TECHNIQUE:

Axial computed tomography images were obtained through the head/brain 

without intravenous contrast.  



Radiation dose:



Total exam DLP = 1673.79 mGy-cm.



This CT exam was performed using one or more of the following dose 

reduction techniques: Automated exposure control, adjustment of the 

mA and/or kV according to patient size, and/or use of iterative 

reconstruction technique.



FINDINGS:



HEMORRHAGE:

Note is made of right frontoparietal parenchymal hemorrhage unchanged 

grossly in extent when compared to the prior examination. There is 

increasing width of the surrounding rim of low attenuation. This 

likely represents a hemorrhagic infarct.  There is evidence of acute 

infarct both more superiorly in the parietal lobe as well as 

multifocal acute right frontal bland infarcts. 



BRAIN:

Mass-effect upon the right lateral ventricle without midline shift. 

No downward herniation.  Basilar cisterns are preserved.  No atrophy 

or chronic microvascular ischemic changes.



VENTRICLES:

No hydrocephalus. 



CALVARIUM:

Unremarkable.



PARANASAL SINUSES:

Unremarkable as visualized. No significant inflammatory changes.



MASTOID AIR CELLS:

Unremarkable as visualized. No inflammatory changes.



OTHER FINDINGS:

None.



IMPRESSION:

Subacute hemorrhagic infarct right frontoparietal with adjacent bland 

infarct both in the superior right parietal lobe as well as 

multifocal small infarcts in the right frontal lobe, along the 

convexity. No appreciable interval change in extent of the focal 

hemorrhage. No herniation.

## 2017-08-19 NOTE — CON
DATE:



PULMONARY CONSULTATION



REFERRING PHYSICIAN:  Dr. Briscoe.



REASON FOR CONSULT:  May have sleep apnea syndrome, status post fall with

bleed.



HISTORY OF PRESENT ILLNESS:  This is a 62-year-old gentleman with past

medical history significant for hypertension, diabetes, legally blind, came

into the emergency room and found to have a right frontal and parietal

intraparenchymal hemorrhage.  Also found to have hyperglycemia with DKA. 

According to the patient, he never lost his consciousness, walked into the

_____.  Presently, he is admitted to intensive care unit, seen by

neurology, has a mild headache, _____ daytime sleepy and tired.  No chest

pain, no nausea, no vomiting, no diarrhea, no polyuria.  No leg pain or leg

swelling.



PAST MEDICAL HISTORY:  Diabetes with peripheral vascular disease, requiring

numbers of toe amputation, retinopathy, hypertension, hyperlipidemia,

coronary artery disease, history of coronary stent, renal failure, obesity.



ALLERGIES:  ALLERGIC TO PENICILLIN.



SOCIAL HISTORY:  No smoke, no drink.



FAMILY HISTORY:  No significant cardiopulmonary disease reported.



MEDICATIONS:  He is on nicardipine, IV insulin coverage, levetiracetam 500

mg q. 12 hours, Protonix 40 mg daily, IV fluid normal saline 100 mL per

hour, Tylenol p.r.n., vancomycin 1 g IV daily.



REVIEW OF SYSTEMS:  Had some headache, rhinitis, admitted to have snoring,

daytime sleepy and tired.  No chest pain, abdominal pain, no dysuria, no

leg pain or leg swelling.



PHYSICAL EXAMINATION:

GENERAL:  Lying in the bed, no acute distress.

VITAL SIGNS:  Temp is 98, heart rate is 50, respiratory rate is 20, blood

pressure 118/52, pulse ox is 100% on nasal cannula.

HEENT:  Moist mucous membrane.  Crowded airway.  Mallampati score is 4.

NECK:  Supple.  No JVD.

LUNGS:  Has fair airflow with rhonchi.

HEART:  S1 and S2.

ABDOMEN:  Soft and nontender.  No organomegaly.

EXTREMITIES:  1+ edema.

NEUROLOGIC:  Awake and alert, follow simple commands.



LABORATORY DATA:  Shows hemoglobin 11.8, hematocrit 32.7, WBC 15.4,

platelet count is 307.  INR 1.06, PTT is 23.  Has VBG done, which shows pH

7.30, pCO2 of 30.  Chemistry shows sodium 143, potassium 3.9, chloride 118,

bicarbonate is 15, BUN is 36, creatinine 2.3, glucose 148, calcium is 8.9,

magnesium 1.8, AST 37, ALT 30, alkaline phosphatase is 88.  C-reactive

protein 3.7.  Microbiology; urine culture, there is no growth.  CAT scan of

the head was done on admission, which shows probably hemorrhage infarct

right posterior, frontoparietal lobe, known hemorrhagic infarct of the

right inferior frontal lobe, there is no significant mass effect and no

midline shift.  X-ray of the pelvis done, which is unremarkable. 

Echocardiogram done shows right ventricular systolic pressure is 18,

systolic function of left ventricle is 35% to 40%.



IMPRESSION AND PLAN:  Intracranial bleed with also ischemia, probably have

a cranial vasospasm, diabetes, chronic obstructive lung disease, coronary

artery disease, anemia, peripheral vascular disease, may have sleep apnea

syndrome.  The patient was seen by Dr. Pardeep Mendoza.  Started on vasodilator

for secondary ischemic stroke.  Keep head elevated to 45 degree.  We will

place the patient on BiPAP 10/7 with 30% of oxygen, very high possibility

of obstructive sleep apnea, try to decrease sympathetic tone.  While

sleeping keep head elevated to 45 degree.  Followup CTA to assure the

stability or assure there is no cranial vasospasm.  We will suggest

attended sleep study as outpatient.  We will follow with you.





__________________________________________

Chris Bingham MD





DD:  08/19/2017 0:17:51

DT:  08/19/2017 5:16:34

Job # [id number]

## 2017-08-19 NOTE — CON
DATE:  08/18/2017



HISTORY OF PRESENT ILLNESS:  This is a 62-year-old gentleman with a known

history of diabetes and hypertension.  He feels he ran into a wall,

possibly struck his head, became altered.  In any case, was seen in the

[facility] ER.  CT documented an intraparenchymal hematoma. 

Neurosurgical evaluation was requested.



PAST MEDICAL HISTORY:  His past medical history is most significant as

above.  The rest of the details are reviewed in the EMR.



PHYSICAL EXAMINATION:  He is actually currently in the process of

undergoing an echocardiogram, thus making the examination difficult;

however, he was quite conversant.  He is oriented to self, hospital, but

believes it is September.  He does follow commands well.  His speech is

essentially fluent.  Pupils were difficult to evaluate.  Face was

symmetric.  He was moving all four extremities with good strength. 

Sensation is grossly intact.  Could not do a visual field exam.



LABORATORY DATA:  CT of the brain shows relatively small right parietal

intraparenchymal hemorrhage.  He has some surrounding areas of lucency that

may be hemorrhagic infarct versus an hypertensive intraparenchymal

hemorrhage.  In any case, there is very minimal mass effect, certainly no

shift.



IMPRESSION AND PLAN:  Relatively small intraparenchymal hemorrhage.  There

was no need for neurosurgical involvement in this case management per

neurology and medicine.





__________________________________________

Dipak Parmar MD



DD:  08/18/2017 15:35:36

DT:  08/19/2017 4:47:52

Job # [id number]

## 2017-08-19 NOTE — CP.PCM.PN
Subjective





- Date & Time of Evaluation


Date of Evaluation: 08/19/17


Time of Evaluation: 06:34





- Subjective


Subjective: 





Patient not in DKA now, small non-anion gap acidosis still present with bicarb 

of 15, will be switched to subq insulin, patient's need has been about 1-1.5 

units/hr of the regular insulin, so he will be started on levemir in a lesser 

24 hr dose ie 15 units, with NPH one dose as well to work in the period till 

levemir starts effect. Regular insulin medium Sliding scale coverage is added.





Objective





- Vital Signs/Intake and Output


Vital Signs (last 24 hours): 


 











Temp Pulse Resp BP Pulse Ox


 


 98.4 F   63   17   125/47 L  95 


 


 08/19/17 04:00  08/19/17 06:20  08/19/17 06:20  08/19/17 06:17  08/19/17 05:10








Intake and Output: 


 











 08/18/17 08/19/17





 18:59 06:59


 


Intake Total 1336 1426


 


Output Total 600 


 


Balance 736 1426














- Medications


Medications: 


 Current Medications





Acetaminophen (Tylenol 325mg Tab)  650 mg PO Q6H PRN


   PRN Reason: Fever >100.4 F


   Last Admin: 08/18/17 15:33 Dose:  650 mg


Sodium Chloride (Sodium Chloride 0.9%)  1,000 mls @ 100 mls/hr IV .Q10H [facility]


   Last Admin: 08/19/17 04:51 Dose:  100 mls/hr


Levetiracetam 500 mg/ Sodium (Chloride)  105 mls @ 460 mls/hr IV Q12 [facility]


   Last Admin: 08/18/17 21:02 Dose:  460 mls/hr


Vancomycin HCl (Vancomycin 1gm)  1 gm in 250 mls @ 167 mls/hr IVPB DAILY [facility]


   PRN Reason: Protocol


   Last Admin: 08/18/17 16:05 Dose:  167 mls/hr


Nicardipine HCl (Cardene Iv Premix)  20 mg in 200 mls @ 50 mls/hr IV .Q4H PRN; 

Protocol; 5 MG/HR


   PRN Reason: TITRATE PER MD ORDER


Insulin Detemir (Levemir)  15 unit SC DAILY [facility]


Insulin Human NPH (Humulin N)  5 units SC ONCE ONE


   Stop: 08/19/17 06:34


Insulin Human Regular (Humulin R Med)  0 units SC Q6 [facility]


   PRN Reason: Protocol


Pantoprazole Sodium (Protonix Ec Tab)  40 mg PO 0600 [facility]


   Last Admin: 08/19/17 06:17 Dose:  Not Given











- Labs


Labs: 


 





 08/19/17 05:30 





 08/19/17 05:30 





 











PT  11.4 Seconds (9.9-11.8)   08/18/17  10:36    


 


INR  1.06  (0.93-1.08)   08/18/17  10:36    


 


APTT  23.1 Seconds (23.7-30.8)  L  08/18/17  10:36

## 2017-08-20 LAB
ALBUMIN/GLOB SERPL: 1.3 {RATIO} (ref 1.1–1.8)
ALP SERPL-CCNC: 96 U/L (ref 38–133)
ALT SERPL-CCNC: 32 U/L (ref 7–56)
AST SERPL-CCNC: 47 U/L (ref 15–59)
BASOPHILS # BLD AUTO: 0.03 K/MM3 (ref 0–2)
BASOPHILS NFR BLD: 0.2 % (ref 0–3)
BILIRUB SERPL-MCNC: 0.5 MG/DL (ref 0.2–1.3)
BUN SERPL-MCNC: 30 MG/DL (ref 7–21)
CALCIUM SERPL-MCNC: 8.8 MG/DL (ref 8.4–10.5)
CHLORIDE SERPL-SCNC: 119 MMOL/L (ref 98–107)
CO2 SERPL-SCNC: 16 MMOL/L (ref 21–33)
EOSINOPHIL # BLD: 0 10*3/UL (ref 0–0.7)
EOSINOPHIL NFR BLD: 0.3 % (ref 1.5–5)
ERYTHROCYTE [DISTWIDTH] IN BLOOD BY AUTOMATED COUNT: 14.5 % (ref 11.5–14.5)
GLOBULIN SER-MCNC: 2.9 GM/DL
GLUCOSE SERPL-MCNC: 119 MG/DL (ref 70–110)
GRANULOCYTES # BLD: 9.76 10*3/UL (ref 1.4–6.5)
GRANULOCYTES NFR BLD: 79.5 % (ref 50–68)
HCT VFR BLD CALC: 27.8 % (ref 42–52)
LYMPHOCYTES # BLD: 1.4 10*3/UL (ref 1.2–3.4)
LYMPHOCYTES NFR BLD AUTO: 11.1 % (ref 22–35)
MAGNESIUM SERPL-MCNC: 1.6 MG/DL (ref 1.7–2.2)
MCH RBC QN AUTO: 28.9 PG (ref 25–35)
MCHC RBC AUTO-ENTMCNC: 36 G/DL (ref 31–37)
MCV RBC AUTO: 80.3 FL (ref 80–105)
MONOCYTES # BLD AUTO: 1.1 10*3/UL (ref 0.1–0.6)
MONOCYTES NFR BLD: 8.9 % (ref 1–6)
PHOSPHATE SERPL-MCNC: 3.1 MG/DL (ref 2.5–4.5)
PLATELET # BLD: 268 10^3/UL (ref 120–450)
PMV BLD AUTO: 10.2 FL (ref 7–11)
POTASSIUM SERPL-SCNC: 4.1 MMOL/L (ref 3.6–5)
PROT SERPL-MCNC: 6.5 G/DL (ref 5.8–8.3)
SODIUM SERPL-SCNC: 146 MMOL/L (ref 132–148)
TROPONIN I SERPL-MCNC: 0.42 NG/ML
WBC # BLD AUTO: 12.3 10^3/UL (ref 4.5–11)

## 2017-08-20 RX ADMIN — LEVETIRACETAM SCH MLS/HR: 5 INJECTION INTRAVENOUS at 22:01

## 2017-08-20 RX ADMIN — NITROGLYCERIN SCH EA: 20 OINTMENT TOPICAL at 19:13

## 2017-08-20 RX ADMIN — INSULIN HUMAN SCH: 100 INJECTION, SOLUTION PARENTERAL at 06:36

## 2017-08-20 RX ADMIN — NICARDIPINE HYDROCHLORIDE PRN MLS/HR: 0.1 INJECTION, SOLUTION INTRAVENOUS at 07:34

## 2017-08-20 RX ADMIN — NITROGLYCERIN SCH EA: 20 OINTMENT TOPICAL at 01:00

## 2017-08-20 RX ADMIN — INSULIN HUMAN SCH: 100 INJECTION, SOLUTION PARENTERAL at 00:00

## 2017-08-20 RX ADMIN — PANTOPRAZOLE SODIUM SCH MG: 40 TABLET, DELAYED RELEASE ORAL at 06:38

## 2017-08-20 RX ADMIN — INSULIN HUMAN SCH: 100 INJECTION, SOLUTION PARENTERAL at 21:59

## 2017-08-20 RX ADMIN — NICARDIPINE HYDROCHLORIDE PRN MLS/HR: 0.1 INJECTION, SOLUTION INTRAVENOUS at 16:10

## 2017-08-20 RX ADMIN — INSULIN HUMAN SCH: 100 INJECTION, SOLUTION PARENTERAL at 15:31

## 2017-08-20 RX ADMIN — LEVETIRACETAM SCH MLS/HR: 5 INJECTION INTRAVENOUS at 10:51

## 2017-08-20 RX ADMIN — NITROGLYCERIN SCH EA: 20 OINTMENT TOPICAL at 16:03

## 2017-08-20 RX ADMIN — INSULIN DETEMIR SCH UNIT: 100 INJECTION, SOLUTION SUBCUTANEOUS at 10:47

## 2017-08-20 RX ADMIN — NITROGLYCERIN SCH EA: 20 OINTMENT TOPICAL at 06:37

## 2017-08-20 RX ADMIN — VANCOMYCIN HYDROCHLORIDE SCH MLS/HR: 1 INJECTION, POWDER, LYOPHILIZED, FOR SOLUTION INTRAVENOUS at 10:51

## 2017-08-20 NOTE — PN
DATE:  08/20/2017



INTENSIVIST NOTE



SUBJECTIVE:  The patient is resting in bed, awake and alert, answering all

question appropriately.  He has O2 via nasal cannula and seems to be very

comfortable.  The patient is on Cardene for his blood pressure and

continues with IV fluids as well.  He has no complaints of pain, with

distress, no shortness of breath, cough, wheezing or chest congestion.  No

abdominal pain or diarrhea.



PHYSICAL EXAMINATION:

VITAL SIGNS:  Temperature is 99, pulse is 69, blood pressure 138/88,

respiratory rate is 16.

HEENT:  Head is atraumatic and normocephalic.  Eyes are reactive to light. 

Ears, nose, and throat seem to be within normal limits.

NECK:  Supple.  No JVD.  No thyroid enlargement.  No lymph nodes.

HEART:  Has a regular rate and rhythm.  Normal S1 and S2.

LUNGS:  Reveal good breath sounds bilaterally.

ABDOMEN:  Soft and nontender.  Normal bowel sounds.  No organomegaly noted.

GENITALIA:  Deferred.

RECTAL:  Deferred.

MUSCULOSKELETAL:  No joint deformities.

EXTREMITIES:  Reveal no significant edema.

NEUROLOGIC:  The patient does have some weakness in the left upper

extremity.



LABORATORY DATA:  Revealed white count of 12.3, hemoglobin of 10.0,

hematocrit 27.8 with platelets of 268,000.  Sodium is 146, potassium 4.1,

chloride 119, CO2 of 16 with a BUN of 30, creatinine of 1.9, and glucose of

119.  The patient still has elevated troponin with 0.42.  CT of the head

reveals subacute hemorrhagic infarct right frontoparietal with adjacent

bland infarct in the superior right parietal lobe as well as multifocal

small infarcts in the right frontal lobe.  No appropriate interval change

and extant of the focal hemorrhage, no herniated.



IMPRESSION:  The patient has subacute cerebral hemorrhage.  He also had a

history of diabetes and presented with diabetic ketoacidosis, which has

resolved.  The patient has a history of hypertension.  As far as our plan,

we will continue with Cardene drip to regulate the patient's blood pressure

as well as the IV fluids.  He is on O2 via nasal cannula.  He continues to

get scheduled neuro checks and he will also continue his Keppra as well as,

his Levemir and his Protonix.  We will continue to treat aggressively and

watch closely along with the other consultants and the primary care doctor.



__________________________________________

Haresh Cottrell MD



DD:  08/20/2017 7:53:05

DT:  08/20/2017 11:07:30

Job # [id number]

## 2017-08-20 NOTE — CON
DATE:  08/19/2017



REASON FOR THE DICTATION:  Covering Dr. Stephen Camara.



REASON FOR THE CONSULTATION:  Positive troponin, admitted with a DKA,

intracranial bleed, leukocytosis, possible non-STEMI.



BRIEF CLINICAL HISTORY:  This is a 62-year-old male with past medical

history significant for diabetes, hypertension, hyperlipidemia, coronary

artery disease, who was recently discharged home.  History of fall in the

kitchen.  The patient said that they brought here and found to be in

diabetic ketoacidosis, sepsis, intracranial bleed, and positive troponin. 

Denies any chest pain, shortness of breath or any palpitation.  The patient

is in , bed #5.



PAST MEDICAL HISTORY:  Significant for coronary artery disease, history of

PTCA and stent in the past, history of diabetes, hypertension,

hyperlipidemia, and chronic renal insufficiency.



SOCIAL HISTORY:  Denies smoking.  Denies any history of alcohol abuse.



CURRENT MEDICATIONS:  The patient is taking at home vitamin D, amlodipine,

glipizide, atorvastatin, aspirin, and Januvia.



ALLERGIES:  ALLERGY TO PENICILLIN.



PREVIOUS CARDIAC WORKUP:  As follows; the patient had echocardiography done

on 08/18/2017 that showed normal LV wall thickness, systolic function

moderately impaired, ejection fraction 35% to 40%, trace aortic

regurgitation, mild mitral regurgitation, trace tricuspid regurgitation and

RV systolic pressure 18.  No pericardial effusion or thrombus noted on

08/18/2017.



REVIEW OF SYSTEMS:  As per HPI.



PHYSICAL EXAMINATION

VITAL SIGNS:  Temperature afebrile, heart rate 71, and blood pressure

142/68.

HEENT:  PERRLA.  Extraocular muscles intact.

NECK:  Supple.  No carotid bruit or thyromegaly.

CHEST:  Clear to auscultation.

HEART:  S1 and S2 regular.

ABDOMEN:  Soft.

EXTREMITIES:  Clubbing and cyanosis negative.



EKG showed normal sinus, LVH.  No acute ST-T changes noted.



LABORATORY DATA:  Blood workup:  WBC 16.8, hemoglobin 10.8, hematocrit 30.3

and platelet count 291.  Chemistry shows sodium 143, potassium 4.2,

chloride 119, carbon dioxide 15, anion gap of 16, BUN of 34, creatinine

2.2.  Troponin 1.05, now trending to 0.68.



IMPRESSION:  A 62-year-old male with a history of diabetes, hypertension,

hyperlipidemia, coronary artery disease, status post stent in the past,

admitted after a fall, leucocytosis, diabetic ketoacidosis and positive

troponin.  Denies any chest pain.  History of acute kidney injury and

chronic renal insufficiency.



RECOMMENDATION:  We will start nitrate, beta blocker, renal dose of _____

q. 12, monitor renal function, broad-spectrum antibiotics.  Follow up the

trend of troponin, and if it trends up then consider cardiac

catheterization; otherwise, we will treat him medically because of the

patient's acute kidney injury, and it will be verge of renal failure if we

use the contrast.  Risk and benefit ratio will be assessed periodically and

upon hospital course, and we will transfer the care Monday to Dr. Camara. 

The patient currently is asymptomatic.  We will follow with you.



Thank you Dr. Briscoe for providing the opportunity in taking care of the

patient, Dawit Carpenter.





.

__________________________________________

Chris Sandoval MD





DD:  08/19/2017 12:56:22

DT:  08/20/2017 0:05:10

Job # [id number]

## 2017-08-20 NOTE — CON
ADDENDUM



The patient was seen, ____ ordered aspirin, Plavix, and Lovenox, but I now

learned that the patient had a CAT scan done that showed intracerebral

bleed, so we will discontinue aspirin, discontinue Plavix, and I will not

start Lovenox.  Discontinued beta blocker and nitrates.  The patient also

had echocardiography done yesterday that showed ejection fraction of 35% to

40%, mild mitral regurgitation, trace tricuspid regurgitation, RV systolic

pressure of 80, no pericardial effusion nor vegetation noted.  We will

transfer the care to Dr. Camara on Monday and try to treat conservatively. 

Initially, I also dictated that we will consider cardiac catheterization,

but given this finding, we will treat the patient conservatively.



Thank you Dr. Briscoe for the opportunity in taking care of the patient,

Dawit Carpenter.







__________________________________________

Chris Sandoval MD





DD:  08/19/2017 13:06:19

DT:  08/20/2017 0:09:25

Job # [id number]

## 2017-08-20 NOTE — PN
DATE:  08/19/2017



SUBJECTIVE:  He is lying in the bed, head at 45 degree.  Does not like CPAP

much.  Feels okay though wants to go home.  No nausea, vomiting or

diarrhea.  No leg pain or swelling.



OBJECTIVE:

VITAL SIGNS:  Temperature is 98, heart rate is 49, respiratory rate is 20,

blood pressure 138/101, pulse ox is 100% on nasal cannula.

HEENT:  Moist mucous membrane.  Crowded airway.  Mallampati score is 4.

NECK:  Supple.  No JVD.

LUNGS:  Has fair airflow with rhonchi.

HEART:  S1 and S2.

ABDOMEN:  Soft and nontender.  No organomegaly.

EXTREMITIES:  There is no much edema.

NEUROLOGIC:  Awake and alert, follow simple commands.



MEDICATIONS:  He is on Cardene drip which is on hold for blood pressure

less then 130, Colace 100 mg daily, insulin coverage, Keppra 500 mg twice a

day, Levemir 15 units subcutaneously daily, metoprolol tartrate 12.5 mg

twice a day, Nitro-Bid 2%, _____q. 6 hours, Protonix 40 mg daily, IV fluid

normal saline 100 mL per hour, Tylenol p.r.n. basis, vancomycin 1 g IV

daily.



LABORATORY DATA:  Shows hemoglobin is 10.9, hematocrit 30.3, WBC 16.2,

platelets of 291.  Sodium is 146, potassium 4.2, chloride 119, CO2 of 15,

BUN of 34, creatinine of 2.2, glucose of 145, calcium 9.2, phosphorous 2.9,

magnesium 1.8, AST 48, ALT 33, alkaline phosphatase is 101, troponin is

0.39.  Microbiology; blood culture is been negative.  CAT scan of the head

was done today shows subacute hemorrhagic infarct, right frontal parietal

with adjacent bland infract both in the superior right parietal lobe as

well as the multifocal small infract in the right frontal lobe along the

convexity.



ASSESSMENT AND PLAN:  Intracranial bleed with ulcer, probably cranial

vasospasm and ischemic stroke, diabetes, chronic obstructive lung disease,

coronary artery disease, anemia, peripheral vascular disease, may have

sleep apnea syndrome.  Spoke to the patient at bedside and also spoke to

nursing staff.  Encourage _____ 45 degree.  Being followed by stroke

neurology.  Follow up labs in the morning.



Thank you and we will follow with you.





__________________________________________

Chris Bingham MD



DD:  08/20/2017 1:49:44

DT:  08/20/2017 3:17:59

[facility] # [id number]

## 2017-08-20 NOTE — PN
SUBJECTIVE:  The patient was examined on the bedside.  Awake, alert, and

moving all 4 extremities.  No nausea, vomiting or diarrhea.  Getting oxygen

by nasal cannula.   No hematuria or hematochezia.  Still has some mild

weakness in the left upper extremity.



PHYSICAL EXAMINATION:

VITAL SIGNS:  Temperature 98.2, pulse 60, respiratory rate 18 and blood

pressure 125/47

HEENT:  Head is normocephalic and atraumatic.  Eyes; PERRLA.  Extraocular

muscles intact.  Conjunctivae clear.  Nose is patent.   Mucous membranes

moist.

NECK:  Supple.  No carotid bruit.  No JVD.  No thyromegaly.

CHEST:  Bilaterally symmetrical.

HEART:  S1 and S2 positive.

LUNGS:  Clear to auscultation.

ABDOMEN:  Soft.  Bowel sounds present.  No organomegaly.

EXTREMITIES:  No edema.  No cyanosis.  Communicating slight weakness in the

left upper extremity.



LABORATORY DATA:  White blood cell 16.2, hemoglobin 10.9, hematocrit 30.3,

and platelets 291,000.  Sodium 146, potassium 4.1, BUN of 34 and creatinine

of 2.2.



ASSESSMENT AND PLAN:  The patient is a 62-year-old male with multiple

medical problems, has cerebrovascular accident after hitting his head on

the wall; has history of fall; diabetic ketoacidosis, is noncompliant;

hypertension; and hypercholesterolemia.  Seen by neurologist Dr. Pardeep Mendoza.  Still in intensive care unit.  We are managing blood pressure with

fever, hypoglycemia, and hyponatremia, seizure precautions.  We follow CT

scan of the head without contrast.  History of renal insufficiency.  Seen

by Dr. Davin De Jesus.  The patient is not in diabetic ketoacidosis.  The

patient does take subQ insulin.  He is getting Levemir. 

Gastrointestinal/deep venous thrombosis prophylaxis, repeat labs.  We will

follow up.







________________________________________

Suzette Briscoe MD





DD:  08/19/2017 20:00:51

DT:  08/20/2017 0:56:06

Job # [id number]

## 2017-08-20 NOTE — CP.PCM.PN
Subjective





- Date & Time of Evaluation


Date of Evaluation: 08/20/17


Time of Evaluation: 15:27





- Subjective


Subjective: 





Mr. Carpenter was seen and examined today in the ICU.  He complained that he 

was bored and said that he would like to go home.  Otherwise, he had no 

complains.  He did not have headache, nausea, vomiting or new weakness. There 

were no acute events overnight. 





Objective





- Vital Signs/Intake and Output


Vital Signs (last 24 hours): 


 











Temp Pulse Resp BP Pulse Ox


 


 98.4 F   55 L  14   137/60   85 L


 


 08/20/17 12:00  08/20/17 15:23  08/20/17 15:23  08/20/17 15:09  08/20/17 12:00








Intake and Output: 


 











 08/20/17 08/20/17





 06:59 18:59


 


Intake Total 2600 


 


Output Total 1200 


 


Balance 1400 














- Medications


Medications: 


 Current Medications





Acetaminophen (Tylenol 325mg Tab)  650 mg PO Q6H PRN


   PRN Reason: Fever >100.4 F


   Last Admin: 08/19/17 11:14 Dose:  650 mg


Docusate Sodium (Colace)  100 mg PO DAILY [facility]


   Last Admin: 08/20/17 10:50 Dose:  100 mg


Sodium Chloride (Sodium Chloride 0.9%)  1,000 mls @ 100 mls/hr IV .Q10H [facility]


   Last Admin: 08/20/17 04:07 Dose:  100 mls/hr


Vancomycin HCl (Vancomycin 1gm)  1 gm in 250 mls @ 167 mls/hr IVPB DAILY [facility]


   PRN Reason: Protocol


   Last Admin: 08/20/17 10:51 Dose:  167 mls/hr


Nicardipine HCl (Cardene Iv Premix)  20 mg in 200 mls @ 50 mls/hr IV .Q4H PRN; 

Protocol; 5 MG/HR


   PRN Reason: TITRATE PER MD ORDER


   Last Admin: 08/20/17 07:34 Dose:  5 mg/hr, 50 mls/hr


Levetiracetam (Keppra 500mg Ivpb)  500 mg in 100 mls @ 460 mls/hr IVPB Q12 [facility]


   Last Admin: 08/20/17 10:51 Dose:  460 mls/hr


Insulin Detemir (Levemir)  15 unit SC DAILY [facility]


   Last Admin: 08/20/17 10:47 Dose:  15 unit


Insulin Human Regular (Humulin R Med)  0 units SC Q6 [facility]


   PRN Reason: Protocol


   Last Admin: 08/20/17 06:36 Dose:  Not Given


Metoprolol Tartrate (Lopressor)  25 mg PO BID [facility]


   Last Admin: 08/20/17 10:50 Dose:  25 mg


Nitroglycerin (Nitro-Bid 2% Oint)  0.5 ea TOP Q6H [facility]


   Last Admin: 08/20/17 06:37 Dose:  0.5 ea


Pantoprazole Sodium (Protonix Ec Tab)  40 mg PO 0600 [facility]


   Last Admin: 08/20/17 06:38 Dose:  40 mg











- Labs


Labs: 


 





 08/20/17 05:15 





 08/20/17 05:15 





 











PT  11.4 Seconds (9.9-11.8)   08/18/17  10:36    


 


INR  1.06  (0.93-1.08)   08/18/17  10:36    


 


APTT  23.1 Seconds (23.7-30.8)  L  08/18/17  10:36    














- Neurological Exam


Additional comments: 





Neurologically unchanged compared with previous exam with left side weakness (

antigravity) and neglect.





Assessment and Plan


(1) Intraparenchymal hematoma of brain


Assessment & Plan: 


Continue BP control and monitor serum sodium, glucose and other metabolic 

derangements.  Treat fevers aggressively if they should arise.  May repeat CT 

head tomorrow and if stable, transfer to medical floor for further management.  

Continue daily PT/OT.  DVT Px with SCD.


Status: Acute

## 2017-08-21 LAB
ALBUMIN/GLOB SERPL: 1.3 {RATIO} (ref 1.1–1.8)
ALP SERPL-CCNC: 97 U/L (ref 38–133)
ALT SERPL-CCNC: 34 U/L (ref 7–56)
AST SERPL-CCNC: 50 U/L (ref 15–59)
BASOPHILS # BLD AUTO: 0.03 K/MM3 (ref 0–2)
BASOPHILS NFR BLD: 0.3 % (ref 0–3)
BILIRUB SERPL-MCNC: 0.6 MG/DL (ref 0.2–1.3)
BUN SERPL-MCNC: 28 MG/DL (ref 7–21)
CALCIUM SERPL-MCNC: 9 MG/DL (ref 8.4–10.5)
CHLORIDE SERPL-SCNC: 117 MMOL/L (ref 98–107)
CHOLEST SERPL-MCNC: 94 MG/DL (ref 130–200)
CHOLEST SERPL-MCNC: 96 MG/DL (ref 130–200)
CO2 SERPL-SCNC: 18 MMOL/L (ref 21–33)
EOSINOPHIL # BLD: 0.1 10*3/UL (ref 0–0.7)
EOSINOPHIL NFR BLD: 1.3 % (ref 1.5–5)
ERYTHROCYTE [DISTWIDTH] IN BLOOD BY AUTOMATED COUNT: 14.5 % (ref 11.5–14.5)
GLOBULIN SER-MCNC: 2.9 GM/DL
GLUCOSE SERPL-MCNC: 118 MG/DL (ref 70–110)
GRANULOCYTES # BLD: 8.16 10*3/UL (ref 1.4–6.5)
GRANULOCYTES NFR BLD: 78.1 % (ref 50–68)
HCT VFR BLD CALC: 29.1 % (ref 42–52)
LYMPHOCYTES # BLD: 1.4 10*3/UL (ref 1.2–3.4)
LYMPHOCYTES NFR BLD AUTO: 13.8 % (ref 22–35)
MAGNESIUM SERPL-MCNC: 1.6 MG/DL (ref 1.7–2.2)
MCH RBC QN AUTO: 29 PG (ref 25–35)
MCHC RBC AUTO-ENTMCNC: 36.1 G/DL (ref 31–37)
MCV RBC AUTO: 80.4 FL (ref 80–105)
MONOCYTES # BLD AUTO: 0.7 10*3/UL (ref 0.1–0.6)
MONOCYTES NFR BLD: 6.5 % (ref 1–6)
PHOSPHATE SERPL-MCNC: 2.8 MG/DL (ref 2.5–4.5)
PLATELET # BLD: 282 10^3/UL (ref 120–450)
PMV BLD AUTO: 10.5 FL (ref 7–11)
POTASSIUM SERPL-SCNC: 4.2 MMOL/L (ref 3.6–5)
PROT SERPL-MCNC: 6.7 G/DL (ref 5.8–8.3)
SODIUM SERPL-SCNC: 147 MMOL/L (ref 132–148)
TROPONIN I SERPL-MCNC: 0.29 NG/ML
WBC # BLD AUTO: 10.5 10^3/UL (ref 4.5–11)

## 2017-08-21 RX ADMIN — LEVETIRACETAM SCH MG: 100 SOLUTION ORAL at 17:00

## 2017-08-21 RX ADMIN — NITROGLYCERIN SCH EA: 20 OINTMENT TOPICAL at 01:15

## 2017-08-21 RX ADMIN — INSULIN HUMAN SCH UNITS: 100 INJECTION, SOLUTION PARENTERAL at 16:53

## 2017-08-21 RX ADMIN — PANTOPRAZOLE SODIUM SCH MG: 40 TABLET, DELAYED RELEASE ORAL at 06:19

## 2017-08-21 RX ADMIN — INSULIN HUMAN SCH UNITS: 100 INJECTION, SOLUTION PARENTERAL at 12:15

## 2017-08-21 RX ADMIN — INSULIN DETEMIR SCH UNIT: 100 INJECTION, SOLUTION SUBCUTANEOUS at 09:53

## 2017-08-21 RX ADMIN — INSULIN HUMAN SCH: 100 INJECTION, SOLUTION PARENTERAL at 08:21

## 2017-08-21 RX ADMIN — VANCOMYCIN HYDROCHLORIDE SCH MLS/HR: 1 INJECTION, POWDER, LYOPHILIZED, FOR SOLUTION INTRAVENOUS at 09:54

## 2017-08-21 RX ADMIN — NITROGLYCERIN SCH EA: 20 OINTMENT TOPICAL at 06:19

## 2017-08-21 RX ADMIN — INSULIN DETEMIR SCH: 100 INJECTION, SOLUTION SUBCUTANEOUS at 10:03

## 2017-08-21 RX ADMIN — LEVETIRACETAM SCH MLS/HR: 5 INJECTION INTRAVENOUS at 09:59

## 2017-08-21 RX ADMIN — INSULIN HUMAN SCH: 100 INJECTION, SOLUTION PARENTERAL at 21:07

## 2017-08-21 NOTE — PN
CARDIOLOGY FOLLOWUP



DATE:  08/21/2017



SUBJECTIVE:  The patient is in bed, without shortness of breath and without

chest pain.



PHYSICAL EXAMINATION:

VITAL SIGNS:  Blood pressure 147/59 off Cardene, heart rate in the 60s.

NECK:  Negative JVD.

LUNGS:  Without rales.

HEART:  Reveals S1 and S2.

EXTREMITIES:  Without edema.



LABORATORY DATA:  Hemoglobin is 10.5.  Chemistries, troponin is 0.29 with

the peak 1.05.



IMPRESSION:

1.  Non-ST-elevation myocardial infarction.

2.  Diabetic ketoacidosis.

3.  Intracerebral bleed.

4.  Hypertension.

5.  Diabetes mellitus.



PLAN:  Given these findings, the patient's blood pressure is currently

stable.  The patient is currently on p.o. antihypertensive medications. 

The patient can be transferred to Telemetry.  There are no plans for

invasive cardiac procedures at this time given his recent intracranial

bleed.  We will continue the beta-blockers.







__________________________________________

Stephen Camara MD





DD:  08/21/2017 10:15:52

DT:  08/21/2017 10:20:10

Job # [id number]

## 2017-08-21 NOTE — CON
DATE:  08/20/2017



CARDIOLOGY CONSULTATION



COVERING:  Dr. Stephen Camara.



REASON FOR CONSULTATION:  Positive troponin, admitted with a DKA,

intracranial bleed, leukocytosis, possible non-STEMI.  Patient denies any

chest pain.  Denies any shortness of breath.  Denies any palpitation.



OBJECTIVE:

GENERAL:  The patient is lying flat on the bed, not in apparent distress.

VITAL SIGNS:  Temperature afebrile, heart rate 64, blood pressure 125/60.

HEENT:  PERRLA.  Extraocular movements intact.

NECK:  Supple.  No carotid bruit or thyromegaly.

CHEST:  Clear to auscultation.

HEART:  S1 and S2 regular.

ABDOMEN:  Soft.

EXTREMITIES:  Clubbing and cyanosis negative.



LABORATORY DATA:  WBC 12.2, hemoglobin 10.0, hematocrit 27.8, platelet

count 268.  Chemistry shows sodium 146, potassium 4.0, chloride 119, carbon

dioxide 16, anion gap of 15, BUN 30, creatinine 1.9.  Troponin 0.42.



IMPRESSION:  A 62-year-old male with a past medical history significant for

diabetes, hypertension, hyperlipidemia, coronary artery disease, who fell

down, brought here and found to be in diabetic ketoacidosis, septic,

intracranial bleed, and positive troponin.  Denies any chest pain.  Maximum

troponin is 0.42.  In phase of renal insufficiency, the creatinine

clearance of 30 mL.  Maximum troponin is 1.05 with creatinine clearance of

30 mL.  Denies any chest pain.  Last echo on 08/18/2017 shows ejection

fraction of 35% to 40%, trace aortic regurgitation, mild mitral

regurgitation, and trace tricuspid regurgitation.  RV systolic pressure 18,

no pericardial effusion noted, dated 08/18/2012.  History of fall,

leukocytosis, diabetic, ketoacidosis, non-ST segment myocardial infarction

possible.



RECOMMENDATIONS:  We will continue beta blocker and nitrates.  Continue

CPK, troponin trending down.  Not a candidate for anticoagulation.  Not a

candidate for aspirin because of intracerebral bleed, ICB.  Not a candidate

to go to the cath lab as the patient is asymptomatic and also has

intracerebral bleed, so we will treat medically for now.  Currently, the

patient is asymptomatic.  When okayed from neurologist, we can start low

dose baby aspirin probably after a couple of days when the risk of further

bleeding intracranially is minimal.  Discussed with Dr. Briscoe.  Discussed

with patient.  Mild renal function, avoid nephrotoxic medication. 

Aggressively control sugar, we will follow with you and transfer care

tomorrow to Dr. Stephen Camara.  The patient is not on aspirin, not on Plavix,

not on Lovenox because as mentioned patient has intracerebral bleed for the

above obvious reason.  We will get lipid profile, TSH, hemoglobin A1c in

the morning.



Thank you Dr. Briscoe for providing the opportunity in taking care of the

patient, Dawit Carpenter, and as mentioned, we will transfer care

tomorrow to Dr. Camara.







_________________________________________

Chris Sandoval MD



DD:  08/20/2017 14:09:42

DT:  08/20/2017 22:09:04

Job # [id number]

## 2017-08-21 NOTE — CP.PCM.PN
Subjective





- Date & Time of Evaluation


Date of Evaluation: 08/21/17


Time of Evaluation: 09:00





- Subjective


Subjective: 





Patient seen and examined. Reports he wants to go home, because he has 

financial obligations


Currently denies any major complaints. Passed speech and swallow, off insulin 

drip, repeat CTH stable, no progression of R parietal bleed. 





Objective





- Vital Signs/Intake and Output


Vital Signs (last 24 hours): 


 











Temp Pulse Resp BP Pulse Ox


 


 98.8 F   47 L  14   152/60 H  99 


 


 08/21/17 12:00  08/21/17 14:50  08/21/17 14:50  08/21/17 14:40  08/21/17 14:50








Intake and Output: 


 











 08/21/17 08/21/17





 06:59 18:59


 


Intake Total 1350 


 


Output Total 500 


 


Balance 850 














- Medications


Medications: 


 Current Medications





Acetaminophen (Tylenol 325mg Tab)  650 mg PO Q6H PRN


   PRN Reason: Fever >100.4 F


   Last Admin: 08/19/17 11:14 Dose:  650 mg


Aspirin (Aspirin Chewable)  81 mg PO DAILY [facility]


   Last Admin: 08/21/17 12:54 Dose:  81 mg


Docusate Sodium (Colace)  100 mg PO DAILY [facility]


   Last Admin: 08/21/17 09:53 Dose:  100 mg


Insulin Detemir (Levemir)  15 unit SC DAILY [facility]


   Last Admin: 08/21/17 10:03 Dose:  Not Given


Insulin Human Regular (Humulin R Med)  0 units SC ACHS [facility]


   PRN Reason: Protocol


   Last Admin: 08/21/17 12:15 Dose:  1 units


Isosorbide Mononitrate (Imdur)  60 mg PO DAILY [facility]


   Last Admin: 08/21/17 10:17 Dose:  60 mg


Levetiracetam (Keppra)  500 mg PO BID [facility]


Metoprolol Tartrate (Lopressor)  25 mg PO BID [facility]


   Last Admin: 08/21/17 09:58 Dose:  25 mg


Pantoprazole Sodium (Protonix Ec Tab)  40 mg PO 0600 [facility]


   Last Admin: 08/21/17 06:19 Dose:  40 mg











- Labs


Labs: 


 





 08/21/17 06:20 





 08/21/17 06:20 





 











PT  11.4 Seconds (9.9-11.8)   08/18/17  10:36    


 


INR  1.06  (0.93-1.08)   08/18/17  10:36    


 


APTT  23.1 Seconds (23.7-30.8)  L  08/18/17  10:36    














- Constitutional


Appears: Well, Non-toxic





- Head Exam


Head Exam: ATRAUMATIC, NORMAL INSPECTION





- Eye Exam


Eye Exam: EOMI, Normal appearance





- Respiratory Exam


Respiratory Exam: Clear to Ausculation Bilateral, NORMAL BREATHING PATTERN





- Cardiovascular Exam


Cardiovascular Exam: +S1, +S2





- GI/Abdominal Exam


GI & Abdominal Exam: Soft, Normal Bowel Sounds





- Extremities Exam


Extremities Exam: Normal Inspection





- Neurological Exam


Neurological Exam: CN II-XII Intact, Oriented x3





Assessment and Plan





- Assessment and Plan (Free Text)


Assessment: 





61yo male a/w ICH








ICH, R Parietal, stable


Hyperglycemia, DM


Dehydration, resolved


Leukocytosis, resolved


Anemia


CKD, stable


HTN








- currently afebrile, HD stable, comfortable, awake, answers appropriately


- on exam has mild LUE/LLE weakness


- CT head noted for R parietal ICH, which is stable on repeat CT head


- Off insulin drip, FS well controlled, patient is OOB to chair








Recommend





- q4hr Neuro checks


- neurology follow up


- Keep SBP<140, has been off Cardene drip


- Seizure prophylaxis as per neurology


- Antipyretics PRN, glycemic control, Na>140


- Levemir, and sliding scale


- monitor electrolytes, Keep K>4, Mg>2


- DC IVF


- DC antibiotics, afebrile, HD stable, cultures negative, procalcitonin negative


- DVT ppx, SCDs


- GI PPx, Pepcid PO


- OK to start ASA as per neurology


- troponin downtrending, ECHO noted, cardiology following


- transfer to telemetry, stable

## 2017-08-21 NOTE — PN
DATE:  08/20/2017



SUBJECTIVE:  The patient is seen and examined on 08/20/2017 on the bedside,

awake, alert, answering all questions.  Getting oxygen with the nasal

canula, comfortable, no complaints.  Patient is not a good historian.  No

shortness of breath.  No coughing.  No wheezing.  No nausea, vomiting or

diarrhea.  No hematuria or hematochezia.



PHYSICAL EXAMINATION:

VITAL SIGNS:  Temperature 99, pulse 59, blood pressure 138/88 and

respiratory rate 16.

HEENT:  Head is normocephalic and atraumatic.  Eyes, PERRLA.  Extraocular

muscles intact.  Conjunctivae clear.  Nose patent.  Mucous membrane moist.

NECK:  Supple.  No carotid bruits, JVD, or thyromegaly.

CHEST:  Bilaterally symmetrical.

HEART:  S1 and S2 positive.

LUNGS:  Good breath sounds bilaterally, clear to auscultation.

ABDOMEN:  Soft.  No organomegaly.

EXTREMITIES:  No edema.  No cyanosis.

NEUROLOGICAL:  The patient is awake and alert.  Moving all 4 extremities,

but has some weakness in the left upper extremity.



LABORATORY DATA:  White blood cell is 12.3, hemoglobin 10.0, hematocrit

27.8, platelets 258.  Sodium 146, potassium 4.1, BUN of 30 and creatinine

of 1.9.  CAT scan of the head revealed subacute hemorrhagic infarct right

frontoparietal with adjacent bland infarct in the superior right parietal

lobe as well as multifocal small infarct in the right frontal lobe.



MEDICATIONS:  Reviewed by me.



ASSESSMENT AND PLAN:  Mr. Dawit Carpenter is 62 years old male with

subacute cerebral hemorrhage, patient is legally blind, has history of

uncontrolled diabetes mellitus, renal insufficiency, diabetic ketoacidosis

which has resolved, history of anemia,getting IV fluid, oxygen with nasal

canula, getting neuro check, on Keppra as well as Levemir and Protonix. 

Patient is a FULL CODE.  Neurologist, Dr. Pardeep Mendoza is on the case. 

Continue blood pressure control.  Monitoring serum electrolytes, especially

sugar.  We will repeat CAT scan of head for the stability of the

infraction.  Physical therapy, occupational therapy.  Gastrointestinal and

deep venous thrombosis prophylaxis.  We will follow up.





__________________________________________

Suzette Briscoe MD



DD:  08/21/2017 10:43:29

DT:  08/21/2017 11:40:26

[facility] # [id number]

## 2017-08-22 RX ADMIN — INSULIN HUMAN SCH: 100 INJECTION, SOLUTION PARENTERAL at 17:44

## 2017-08-22 RX ADMIN — LEVETIRACETAM SCH MG: 100 SOLUTION ORAL at 17:44

## 2017-08-22 RX ADMIN — INSULIN HUMAN SCH: 100 INJECTION, SOLUTION PARENTERAL at 07:52

## 2017-08-22 RX ADMIN — PANTOPRAZOLE SODIUM SCH: 40 TABLET, DELAYED RELEASE ORAL at 06:19

## 2017-08-22 RX ADMIN — INSULIN HUMAN SCH: 100 INJECTION, SOLUTION PARENTERAL at 14:10

## 2017-08-22 RX ADMIN — INSULIN HUMAN SCH: 100 INJECTION, SOLUTION PARENTERAL at 21:48

## 2017-08-22 RX ADMIN — LEVETIRACETAM SCH MG: 100 SOLUTION ORAL at 09:56

## 2017-08-22 RX ADMIN — INSULIN DETEMIR SCH UNIT: 100 INJECTION, SOLUTION SUBCUTANEOUS at 09:55

## 2017-08-22 NOTE — PN
SUBJECTIVE:  The patient was seen and examined on the bedside, in the unit,

wanted to go home because he said he wanted to go on his own bed, wanted

his home back, but I educated him that he lives alone and he needs 24-hour

service.  He understands.  Got speech evaluation, passed the test.  Off the

insulin drip.  Repeat CAT scan shows stable, no progression of right

parietal bleed.  No nausea, vomiting, or diarrhea.  No hematuria or

hematochezia.  No swelling of the legs.



PHYSICAL EXAMINATION:

VITAL SIGNS:  Temperature 98.8, pulse 47, respiratory rate 14, blood

pressure 152/68, pulse oximetry 99%.

HEENT:  Head normocephalic and atraumatic.  Eyes:  PERRLA.  Extraocular

muscles are intact.  Conjunctivae clear.  Nose patent.

NECK:  Supple.  No carotid bruits.  No JVD or thyromegaly.

CHEST:  Bilaterally symmetrical.

HEART:  S1 and S2 positive.

LUNGS:  Clear to auscultation.

ABDOMEN:  Soft.  Bowel sounds positive.  No organomegaly.

EXTREMITIES:  No edema.  No cyanosis.

NEUROLOGIC:  The patient is awake and alert.  Moving all four extremities

except the left upper extremity.



LABORATORY DATA:  White blood cells 7.5, hemoglobin 10.5, hematocrit 29.1,

and platelets 282.  Sodium 147, potassium 4.2, BUN 20, creatinine 1.8, and

glucose 118.



ASSESSMENT AND PLAN:  The patient is a 62-year-old male with anemia; renal

insufficiency, on hemodialysis; hyperglycemia, maybe blind; uncontrolled

diabetes mellitus; has right parietal lobe bleeding, stable; dehydration,

resolved; leukocytosis, resolved; chronic kidney disease, stable; and

hypertension.  The patient has mild left upper and lower extremity

weakness.  CT scan of the head noted.  Off insulin drip, fingerstick

remains controlled.  The patient is out of bed and chair, again a q. 4 hour

neuro check.  Keep systolic blood pressure less than 140, off the Cardene

drip.  Seizure prophylaxis, antipyretic p.r.n., and glycemic control,

insulin more than 40, Levemir.  Discontinue antibiotics, afebrile, and

hemodynamically stable.  Deep vein thrombosis and gastrointestinal

prophylaxis.  Repeat labs.  We will follow up.







__________________________________________

Suzette Briscoe MD





DD:  08/21/2017 23:48:32

DT:  08/22/2017 2:21:42

Job # [id number]

## 2017-08-22 NOTE — PN
DATE:  08/22/2017



SUBJECTIVE:  The patient is in bed, without shortness of breath.  No

apparent seizures have occurred.



OBJECTIVE:

VITAL SIGNS:  Blood pressure 168/72, heart rate is in this in the 60s.

NECK:  Negative JVD.

LUNGS:  Without rales.

HEART:  S1, S2.

Extremities:  Without edema.



LABORATORY DATA:  Laboratories revealed a glucose of 149.



IMPRESSION:

1.  Status post seizure activity.

2.  Recent non-ST-elevation myocardial infarction.

3.  Diabetic ketoacidosis.

4.  Intracerebral bleed.

5.  Hypertension.

6.  Diabetes mellitus.



PLAN:  Given these findings, the patient's non-STEMI is well-controlled

with medication.  Given the intracranial bleed, no plans for invasive

cardiac procedures in the near future.







__________________________________________

Stephen Camara MD



DD:  08/22/2017 10:20:31

DT:  08/22/2017 11:07:25

Job # [id number]

## 2017-08-22 NOTE — PN
DATE:  08/21/2017



REFERRING PHYSICIAN:  Dr. Briscoe.



SUBJECTIVE:  He is lying in the bed, frustrated, wants to go home.  Denies

any headache.  No nausea.  No vomiting.  No diarrhea.  No leg pain.  No leg

swelling.



PHYSICAL EXAMINATION:

GENERAL:  No acute distress.

VITAL SIGNS:  Temperature 98, heart rate 64, respiratory rate is 20 and

blood pressure 148/71.

HEENT:  Moist mucous membranes.  Crowded airway.  Mallampati score is 4.

NECK:  Supple.  No JVD.

LUNGS:  Has fair airflow with rhonchi.

HEART:  S1 and S2.

ABDOMEN:  Soft and nontender.  No organomegaly.

EXTREMITIES:  There is no edema.

NEUROLOGIC:  Sleepy, but was able to follow simple command.



MEDICATIONS:  He is on aspirin 81 mg daily, Colace 100 mg daily, insulin

coverage, Imdur 60 mg daily, Keppra 500 mg twice a day, Levemir 50 units

subQ daily, metoprolol tartarate 25 mg twice a day, Protonix 40 mg daily

and Tylenol p.r.n. basis.



LABORATORY DATA:  Shows hemoglobin 10.5, hematocrit 29.1, WBC 10.5,

platelet count is 282.  Sodium 147, potassium 4.2, chloride 117,

bicarbonate 18, BUN 28, creatinine 1.8, glucose 118, hemoglobin A1c 6.5,

calcium is 9.0, phosphorous 2.8, magnesium 1.6, AST 50, ALT 34, alk phos is

97, CPK-MB is 2.4, troponin 1.29, albumin is 3.8 and cholesterol 96.  TSH

1.26.  Microbiology; blood culture has been negative.



IMPRESSION AND PLAN:  Intracranial bleed, also with ischemic stroke,

probably vasoconstriction; diabetes; chronic obstructive lung disease;

coronary artery disease; anemia; peripheral vascular disease; may have

sleep apnea syndrome.  Spoke to the patient in detail, discussed his

disease and his natural history of disease.  Encouraged him to use BiPAP. 

Spoke to nursing staff.  The patient may be transferred out of the

intensive care unit.  Neurology followup.







__________________________________________

Chris Bingham MD





DD:  08/21/2017 21:00:43

DT:  08/22/2017 1:21:10

Job # [id number]

## 2017-08-23 VITALS — SYSTOLIC BLOOD PRESSURE: 105 MMHG | DIASTOLIC BLOOD PRESSURE: 47 MMHG | OXYGEN SATURATION: 97 % | TEMPERATURE: 97.9 F

## 2017-08-23 VITALS — HEART RATE: 57 BPM

## 2017-08-23 VITALS — RESPIRATION RATE: 20 BRPM

## 2017-08-23 LAB
ALBUMIN/GLOB SERPL: 1.3 {RATIO} (ref 1.1–1.8)
ALP SERPL-CCNC: 106 U/L (ref 38–133)
ALT SERPL-CCNC: 30 U/L (ref 7–56)
AST SERPL-CCNC: 54 U/L (ref 15–59)
BASOPHILS # BLD AUTO: 0.04 K/MM3 (ref 0–2)
BASOPHILS NFR BLD: 0.5 % (ref 0–3)
BILIRUB SERPL-MCNC: 0.7 MG/DL (ref 0.2–1.3)
BUN SERPL-MCNC: 28 MG/DL (ref 7–21)
CALCIUM SERPL-MCNC: 9 MG/DL (ref 8.4–10.5)
CHLORIDE SERPL-SCNC: 113 MMOL/L (ref 98–107)
CO2 SERPL-SCNC: 19 MMOL/L (ref 21–33)
EOSINOPHIL # BLD: 0.2 10*3/UL (ref 0–0.7)
EOSINOPHIL NFR BLD: 2.9 % (ref 1.5–5)
ERYTHROCYTE [DISTWIDTH] IN BLOOD BY AUTOMATED COUNT: 14.1 % (ref 11.5–14.5)
GLOBULIN SER-MCNC: 2.9 GM/DL
GLUCOSE SERPL-MCNC: 115 MG/DL (ref 70–110)
GRANULOCYTES # BLD: 4.92 10*3/UL (ref 1.4–6.5)
GRANULOCYTES NFR BLD: 64.8 % (ref 50–68)
HCT VFR BLD CALC: 29.1 % (ref 42–52)
LYMPHOCYTES # BLD: 1.7 10*3/UL (ref 1.2–3.4)
LYMPHOCYTES NFR BLD AUTO: 22.7 % (ref 22–35)
MAGNESIUM SERPL-MCNC: 1.8 MG/DL (ref 1.7–2.2)
MCH RBC QN AUTO: 29 PG (ref 25–35)
MCHC RBC AUTO-ENTMCNC: 36.8 G/DL (ref 31–37)
MCV RBC AUTO: 78.9 FL (ref 80–105)
MONOCYTES # BLD AUTO: 0.7 10*3/UL (ref 0.1–0.6)
MONOCYTES NFR BLD: 9.1 % (ref 1–6)
PHOSPHATE SERPL-MCNC: 3 MG/DL (ref 2.5–4.5)
PLATELET # BLD: 313 10^3/UL (ref 120–450)
PMV BLD AUTO: 10.7 FL (ref 7–11)
POTASSIUM SERPL-SCNC: 3.4 MMOL/L (ref 3.6–5)
PROT SERPL-MCNC: 6.7 G/DL (ref 5.8–8.3)
SODIUM SERPL-SCNC: 146 MMOL/L (ref 132–148)
WBC # BLD AUTO: 7.6 10^3/UL (ref 4.5–11)

## 2017-08-23 RX ADMIN — INSULIN HUMAN SCH UNITS: 100 INJECTION, SOLUTION PARENTERAL at 16:50

## 2017-08-23 RX ADMIN — INSULIN HUMAN SCH: 100 INJECTION, SOLUTION PARENTERAL at 11:45

## 2017-08-23 RX ADMIN — PANTOPRAZOLE SODIUM SCH: 40 TABLET, DELAYED RELEASE ORAL at 05:15

## 2017-08-23 RX ADMIN — LEVETIRACETAM SCH MG: 100 SOLUTION ORAL at 09:30

## 2017-08-23 RX ADMIN — INSULIN DETEMIR SCH UNIT: 100 INJECTION, SOLUTION SUBCUTANEOUS at 09:30

## 2017-08-23 RX ADMIN — LEVETIRACETAM SCH: 100 SOLUTION ORAL at 18:01

## 2017-08-23 RX ADMIN — INSULIN HUMAN SCH: 100 INJECTION, SOLUTION PARENTERAL at 07:50

## 2017-08-23 NOTE — PN
PULMONARY PROGRESS NOTE



DATE:  08/23/2017



REFERRING PHYSICIAN:  Suzette Briscoe MD



SUBJECTIVE:  He is lying in the bed, head at 45 degree.  Feels okay.  No

headache.  No rhinitis.  No nausea.  No vomiting.  No leg pain or leg

swelling.



OBJECTIVE:

GENERAL:  No acute distress.

VITAL SIGNS:  Temperature 98,heart rate is 64, respiratory rate is 20,

blood pressure 157/77, pulse ox 99% on nasal cannula.

HEENT:  Moist mucous membranes.  Crowded airway.  Mallampati score is 4.

NECK:  Supple.  No JVD.

LUNGS:  Fair airflow with few rhonchi.

HEART:  S1 and S2.

ABDOMEN:  Soft, nontender.  No organomegaly.

EXTREMITIES:  No edema.

NEUROLOGIC:  Sleepy, but was able to follow simple commands.



MEDICATIONS:  He is on aspirin 81 mg daily, Ativan 0.25 mg twice a day

p.r.n., Colace 100 mg daily, insulin coverage, Imdur 60 mg daily, Keppra

500 mg twice a day, Levemir 50 units subcutaneous daily, Lipitor 20 mg

daily, metoprolol tartarate 25 mg twice a day, Protonix 40 mg daily, IV

fluid normal saline 75 mL per hour, Tylenol p.r.n. basis.



LABORATORY DATA:  Shows hemoglobin is 10.7, hematocrit 29.1, WBC 7.6,

platelet count 313.  Sodium 146, potassium 2.4, chloride 113, bicarbonate

19, BUN 28, creatinine 1.7, glucose 115, calcium 9.0, phosphorous 3.0,

magnesium 1.8, AST 54, ALT 30, alkaline phosphatase is 106, albumin is 3.7.

Microbiology:  Blood culture has been negative.



IMPRESSION AND PLAN:  Intracranial bleed, also _____ with ischemic stroke,

chronic obstructive lung disease; coronary artery disease; anemia;

peripheral vascular disease; may have sleep apnea syndrome.  Encouraged

continuous positive airway pressure therapy us, keep head at 45 degrees,

fall precaution, get physical therapy, may benefit from Trauma Intensive

Care Unit type services, will need a  consult for home safety

and we will follow with you.



__________________________________________

Chris Bingham MD



DD:  08/23/2017 15:44:24

DT:  08/23/2017 16:12:11

[facility] # [id number]

## 2017-08-23 NOTE — PN
DATE:  08/23/2017



SUBJECTIVE:  The patient is sitting in a chair without angina.



PHYSICAL EXAMINATION:

VITAL SIGNS:  Stable.

NECK:  Negative JVD.

LUNGS:  Without rales.

HEART:  S1, S2.

EXTREMITIES:  Without edema.



LABORATORY DATA:  Noted.



IMPRESSION:

1.  Status post intracerebral bleed.

2.  Recent non-ST-elevation myocardial infarction.

3.  Coronary artery disease.

4.  Obesity.

5.  Diabetes mellitus.



PLAN:  Given these findings; given the recent non-STEMI and the

intracerebral bleed, we will continue on medical therapy which include beta

blockers as well as aspirin.  We will add Lipitor to his regimen.





__________________________________________

Stephen Camara MD





DD:  08/23/2017 13:42:58

DT:  08/23/2017 14:08:10

Job # [id number]

## 2017-08-23 NOTE — PN
PULMONARY PROGRESS NOTE



DATE:  08/22/2017



REFERRING PHYSICIAN:  Dr. Briscoe



SUBJECTIVE:  The patient is sitting at the side of the bed, having dinner,

frustrated and wants to go home.  He would like to sit outside in the

hallway.  No headache.  No rhinitis.  No nausea, vomiting or diarrhea.  No

leg pain or leg swelling.



PHYSICAL EXAMINATION

GENERAL:  No acute distress.

VITAL SIGNS:  Temperature 98, heart rate 50, respiratory rate is 20 and

blood pressure 168/72.

HEENT:  Moist mucous membranes.  Crowded airway.  Mallampati score is 4.

NECK:  Supple.  No JVD.

LUNGS:  Fair airflow with few rhonchi.

HEART:  S1 and S2.

ABDOMEN:  Soft and nontender.  No organomegaly.

EXTREMITIES:  There is no edema.

NEUROLOGIC:  Awake, alert and follows simple commands..



MEDICATIONS:  He is on aspirin 81 mg daily, Colace 100 mg daily, insulin

coverage, Imdur 60 mg daily, Keppra 500 mg twice a day, Levemir 50 units

subcutaneous daily, metoprolol tartarate 25 mg twice a day, Protonix 40 mg

daily and Tylenol p.r.n. basis.



LABORATORY DATA:  Shows blood sugar this morning 125.  Microbiology; blood

culture has been negative.



ASSESSMENT AND PLAN:  Intracranial bleed also with ischemic stroke,

diabetes; chronic obstructive lung disease; coronary artery disease;

anemia; peripheral vascular disease; may have sleep apnea syndrome.  Spoke

to nursing staff.  Also spoke to the patient in detail.  Risk and benefits

of going home discussed, the patient is willing to stay.  Will get physical

therapy, fall precaution, encouraged CPAP use.  Being followed by

Neurology.  We will follow with you.





__________________________________________

Chris Bingham MD



DD:  08/22/2017 23:21:53

DT:  08/23/2017 0:32:01

Job # [id number]

## 2017-08-23 NOTE — PN
DATE:  08/22/2017



SUBJECTIVE:  The patient is 62 years old male.  He is doing better,

restless, want to go home.  No headache.  No nausea, vomiting, or diarrhea.

No hematuria or hematochezia.  The patient is blind.  He is not able to

take care of himself, but want to take care of himself.  We will call psych

consult to evaluate the patient's competency.



PHYSICAL EXAMINATION

VITAL SIGNS:  Temperature 98, heart rate 50, respiratory rate 20, blood

pressure 158/70.

HEENT:  Head; normocephalic and atraumatic.  Eyes; extraocular muscles

intact.  Conjunctivae clear.  Nose patent.  Mucous membranes moist.

NECK:  Supple.  No carotid bruits, JVD, or thyromegaly.

LUNGS:  Fair air flow with few rhonchi.

HEART:  S1 and S2 positive.

ABDOMEN:  Soft and nontender.  No organomegaly.

EXTREMITIES:  No edema, no cyanosis.

NEUROLOGIC:  The patient is awake and alert.  Follows simple commands.



MEDICATIONS:  Aspirin, Colace, insulin, Imdur, Keppra, Levemir, metoprolol,

Protonix, and Tylenol.



LABORATORY DATA:  We do not have recent labs today, but I reviewed old

labs.  Blood sugar is 125.



ASSESSMENT AND PLAN:  The patient is a 62 years old male with multiple

medical problems; very noncompliant; diabetes mellitus, uncontrolled;

intracranial bleeding with ischemic stroke; chronic obstructive lung

disease; coronary artery disease; anemia; peripheral vascular disease;

sleep apnea syndrome.  At this time, came with a diabetic ketoacidosis. 

Out of bed, physical therapy, gastrointestinal/deep vein thrombosis

prophylaxis.  The patient wants to go home.  Length of time education done.

I explained to him that he cannot discharge himself right now.  He needs

more days' hospitalization and even rehab.  We will follow up.







__________________________________________

Suzette Briscoe MD





DD:  08/23/2017 7:42:39

DT:  08/23/2017 8:11:23

Job # [id number]

## 2017-08-24 NOTE — CP.PCM.PCO
Physician Communication Note





- Physician Communication Note


Physician Communication Note: pt was discharged to the [facility]

## 2017-08-24 NOTE — CON
HISTORY OF PRESENT ILLNESS:  The patient is a 62-year-old  male

with multiple medical problems.  The patient was admitted on the medical

side status post an unwitnessed fall.  The patient was found to have right

frontoparietal parenchymal hemorrhages.  Psych consult was called for

evaluation of depressive symptoms.  The patient was seen and examined

today.  The patient presented to be alert.  The patient obviously has

disinhibitions; for example, earlier the patient said that he is in an

aggressive mood and he wants to like spank people, but based on the

presentation, the patient did not act on that thought and does not have any

aggression or agitation.  This writer evaluated the patient with nurse

practitioner.  The patient presented to be alert.  The patient said that

earlier he had feeling that he wants to spank people.  The patient also

made comment that he wants to put this writer in a kneeling position and

spank her, but besides that, the patient was not aggressive, was saying

random things, and thought process was circumstantial.  The patient denied

being depressed, but the patient was making statements that bad news on the

TV about politics make him feel very down.  The patient adamantly denied

thoughts of harming himself or others, denies intent or plan, denies

hearing voices, denies seeing things.  The patient also denied paranoid

ideations.  The patient does not present to be psychotic.  The patient

denied feeling anxious.  Reported to have fair appetite and sleep.  As per

collateral information from the nursing staff, the patient did not like

medication Keppra and was complaining that it makes him feel sick, but it

is up to the neurologist to change that medication.  The patient denied

history of being admitted to the psychiatric inpatient unit.  The patient

denied history of suicidal attempts.



PHYSICAL EXAMINATION:

VITAL SIGNS:  Seem to be stable.  Temperature 97.6, pulse is 64, blood

pressure 157/77, respirations 26, oxygen saturation is 99%.



MEDICATIONS:  Reviewed.  Tylenol, aspirin, Colace, Levemir, Humalog, Imdur,

Keppra, Lopressor, Protonix.



LABORATORY DATA:  Reviewed.  Chemistry showed potassium is 3.4, chloride is

113.  Urinalysis showed urine blood moderate.



Notes from neurologist reviewed, cardiologist reviewed, pulmonologist

reviewed.  Head CT scan was also reviewed, subacute hemorrhagic infarct in

the right frontoparietal lobe was found.



MENTAL STATUS EXAMINATION:  The patient obviously has disinhibitions.  The

patient was saying random things about spanking people, but no physical

aggression.  Fair eye contact.  Speech was of normal rate, tone, quality,

and quantity.  Mood was described as <I,m not depressed.L  Affect was

constricted.  Thought process was circumstantial.  Thought content, the

patient denied visual, auditory, or tactile hallucinations.  Denies

paranoid ideation.  The patient denied thoughts of harming himself or

others.  Denied intent or plan.  Insight and judgment seem to be fair. 

Impulses are well controlled.



IMPRESSION:  Rule out mood disorder due to general medical condition.  Rule

out disinhibition syndrome secondary to hemorrhage to the right

frontoparietal area.  The patient had hemorrhagic infarct.



PLAN:  Neurology followup as per medical team.  This writer does not feel

that the patient is a danger to self or others.  The patient does not have

any physical aggression or agitation.  The patient could say random things;

for example, to spank people, but not act on the thoughts.  This writer

will follow up on this patient and advise accordingly.  Case was discussed

with nurse practitioner.  Physical therapy recommended, subacute rehab or

TCU.

 

This writer also would not recommend any antipsychotic medication or

serotonin reuptake inhibitors because it could prolong bleeding and

antipsychotic medication could give risk of strokes.  If the patient needs

to be medicated, benzodiazepine is the medication of choice.



Thank you very much for letting me to participate in care of your patient. 

Should you have any question, give me a call back.







__________________________________________

Simona Damon MD







DD:  08/23/2017 12:18:41

DT:  08/23/2017 14:18:33

Job # [id number]

## 2017-09-06 ENCOUNTER — HOSPITAL ENCOUNTER (EMERGENCY)
Dept: HOSPITAL 42 - ED | Age: 62
Discharge: LEFT BEFORE DISCHARGE | End: 2017-09-06
Payer: MEDICARE

## 2017-09-06 VITALS — RESPIRATION RATE: 18 BRPM

## 2017-09-06 VITALS — OXYGEN SATURATION: 98 % | TEMPERATURE: 98.2 F

## 2017-09-06 VITALS — BODY MASS INDEX: 34.9 KG/M2

## 2017-09-06 VITALS — HEART RATE: 60 BPM

## 2017-09-06 VITALS — SYSTOLIC BLOOD PRESSURE: 157 MMHG | DIASTOLIC BLOOD PRESSURE: 72 MMHG
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
## 2017-09-06 DIAGNOSIS — Z03.89: Primary | ICD-10-CM

## 2017-09-06 LAB
ALBUMIN/GLOB SERPL: 1.5 {RATIO} (ref 1.1–1.8)
ALP SERPL-CCNC: 117 U/L (ref 38–126)
ALT SERPL-CCNC: 32 U/L (ref 7–56)
APTT BLD: 25.9 SECONDS (ref 23.7–30.8)
AST SERPL-CCNC: 51 U/L (ref 17–59)
BASOPHILS # BLD AUTO: 0.05 K/MM3 (ref 0–2)
BASOPHILS NFR BLD: 0.5 % (ref 0–3)
BILIRUB SERPL-MCNC: 0.9 MG/DL (ref 0.2–1.3)
BUN SERPL-MCNC: 26 MG/DL (ref 7–21)
CALCIUM SERPL-MCNC: 9.9 MG/DL (ref 8.4–10.5)
CHLORIDE SERPL-SCNC: 102 MMOL/L (ref 98–107)
CO2 SERPL-SCNC: 19 MMOL/L (ref 21–33)
EOSINOPHIL # BLD: 0.1 10*3/UL (ref 0–0.7)
EOSINOPHIL NFR BLD: 1 % (ref 1.5–5)
ERYTHROCYTE [DISTWIDTH] IN BLOOD BY AUTOMATED COUNT: 14.9 % (ref 11.5–14.5)
GLOBULIN SER-MCNC: 2.9 GM/DL
GLUCOSE SERPL-MCNC: 135 MG/DL (ref 70–110)
GRANULOCYTES # BLD: 6.8 10*3/UL (ref 1.4–6.5)
GRANULOCYTES NFR BLD: 72.4 % (ref 50–68)
HCT VFR BLD CALC: 31.8 % (ref 42–52)
INR PPP: 1.07 (ref 0.93–1.08)
LYMPHOCYTES # BLD: 1.7 10*3/UL (ref 1.2–3.4)
LYMPHOCYTES NFR BLD AUTO: 18.6 % (ref 22–35)
MCH RBC QN AUTO: 29.5 PG (ref 25–35)
MCHC RBC AUTO-ENTMCNC: 36.5 G/DL (ref 31–37)
MCV RBC AUTO: 80.9 FL (ref 80–105)
MONOCYTES # BLD AUTO: 0.7 10*3/UL (ref 0.1–0.6)
MONOCYTES NFR BLD: 7.5 % (ref 1–6)
PLATELET # BLD: 308 10^3/UL (ref 120–450)
PMV BLD AUTO: 10.7 FL (ref 7–11)
POTASSIUM SERPL-SCNC: 3.9 MMOL/L (ref 3.6–5)
PROT SERPL-MCNC: 7.2 G/DL (ref 5.8–8.3)
SODIUM SERPL-SCNC: 139 MMOL/L (ref 132–148)
WBC # BLD AUTO: 9.4 10^3/UL (ref 4.5–11)

## 2017-09-06 NOTE — ED PDOC
Arrival/HPI





- General


Chief Complaint: Altered Mental Status


Time Seen by Provider: 09/06/17 12:05


Historian: Patient





- History of Present Illness


Narrative History of Present Illness (Text): 


09/06/17 12:26


A 62 year old male, whose past medical history includes hypertension, Diabetes, 

blindness, was brought in by EMS and presents to the emergency department 

complaining of unwitnessed fall. Patient was found on the floor of his home by 

PMD, who called an ambulance afterwards.  Patient states he fell off his bed 

and was on the floor for a couple hours.  Also, he mentions he was discharged 

from rehab recently. He does not want to remain in [facility] and wants to leave AMA. 

Patient does not state any other complaints at this time.





PMD: Dr. Alex Byrne





Time/Duration: 4-6 hours


Symptom Onset: Sudden


Context: Home





Past Medical History





- Provider Review


Nursing Documentation Reviewed: Yes





- Infectious Disease


Hx of Infectious Diseases: None





- Tetanus Immunization


Tetanus Immunization: Up to Date





- Cardiac


Hx Cardiac Disorders: Yes (CAD)


Hx Congestive Heart Failure: Yes


Hx Hypertension: Yes





- Pulmonary


Hx Chronic Obstructive Pulmonary Disease (COPD): Yes





- Neurological


Hx Neurological Disorder: No





- HEENT


Hx HEENT Disorder: Yes (BLURRY VISION TO LEFT EYE)


Hx Cataracts: Yes (Right eye)





- Renal


Hx Renal Failure: Yes





- Endocrine/Metabolic


Hx Diabetes Mellitus Type 2: Yes





- Hematological/Oncological


Hx Blood Disorders: Yes


Other/Comment: hx mrsa





- Integumentary


Hx Dermatological Disorder: Yes (SCARRING TO AMPUTATED RIGHT AND LEFT TOES.ALL 

TOES L.RIGHT 3 TOES.)





- Musculoskeletal/Rheumatological


Hx Musculoskeletal Disorders: No


Hx Falls: No





- Gastrointestinal


Hx Gastrointestinal Disorders: Yes


Hx Gall Bladder Disease: Yes





- Genitourinary/Gynecological


Hx Genitourinary Disorders: No





- Psychiatric


Hx Psychophysiologic Disorder: Yes


Hx Anxiety: Yes


Hx Depression: Yes


Hx Substance Use: No





- Surgical History


Hx Amputation: Yes (of toes on both feet)


Hx Cardiac Catheterization: Yes


Hx Cholecystectomy: Yes


Hx Coronary Stent: Yes (6)


Hx Gastric Bypass Surgery: No


Hx Orthopedic Surgery: Yes (LEFT FT ALL TOES  AMP)


Other/Comment: Amputation of 3 toes right foot. Amputation of left foot toes.





- Anesthesia


Hx Anesthesia: Yes


Hx Anesthesia Reactions: No


Hx Malignant Hyperthermia: No





- Suicidal Assessment


Feels Threatened In Home Enviroment: No





Family/Social History





- Physician Review


Nursing Documentation Reviewed: Yes


Family/Social History: No Known Family HX


Smoking Status: Never Smoked


Hx Alcohol Use: No


Hx Substance Use: No


Hx Substance Use Treatment: No





Allergies/Home Meds


Allergies/Adverse Reactions: 


Allergies





Penicillins Allergy (Verified 09/06/17 12:17)


 RASH








Home Medications: 


 Home Meds











 Medication  Instructions  Recorded  Confirmed


 


Aspirin [Ecotrin] 81 mg PO DAILY 08/18/17 09/06/17


 


Glipizide [Glipizide Xl] 5 mg PO DAILY 08/18/17 09/06/17


 


Voltaren 1%  09/06/17 














Review of Systems





- Physician Review


All systems were reviewed & negative as marked: Yes





- Review of Systems


Constitutional: absent: Fevers


Respiratory: absent: SOB





Physical Exam





- Physical Exam


Narrative Physical Exam (Text): 


Constitutional: No acute distress.


Head: Normocephalic.  Atraumatic.  


Eyes:  Right eye opacified.


ENT:  Moist mucous membranes.


Neck:  Supple. No midline tenderness.


Cardiovascular:  Regular rate.


Chest: No tenderness.


Respiratory:  Clear to auscultation bilaterally.


GI:  Soft. Nontender. Nondistended. 


Back:  No CVA tenderness. No midline tenderness.


Musculoskeletal:  No tenderness or swelling of extremities. FROM x 4.


Skin:  No rash. 


Neurologic:  Alert, no focal deficit.








Vital Signs Reviewed: Yes


Vital Signs











  Temp Pulse Resp BP Pulse Ox


 


 09/06/17 14:08  98.2 F  60  18  157/72 H  98


 


 09/06/17 12:04  98.1 F  59 L  18  160/68 H  99











Temperature: Afebrile


Blood Pressure: Hypertensive


Pulse: Regular


Respiratory Rate: Normal


Appearance: Positive for: Well-Appearing


Pain Distress: None





Medical Decision Making


ED Course and Treatment: 


09/06/17 12:33


Impression: 62 year old male with unwitnessed fall. Physical exam shows no 

midline tenderness of back and neck; right eye opacified. 





Plan:


-- Head CT


-- EKG


-- Labs


-- Chest X-ray


-- Urine Culture


-- Urinalysis 


-- Reassess and disposition





Prior Visits:


Notes and results from previous visits were reviewed. On 08/18/2017 for 

unwitnessed fall.  Patient was diagnosed with leukocytosis and intracranial 

bleed. patient was admitted.





Progress Notes:


EKG:


Ordered, reviewed, and independently interpreted the EKG.


Rate : 60 BPM


Rhythm : NSR


Interpretation : No ST-segment elevations or depressions, no T-wave inversions, 

normal intervals.


Comparison : No previous EKG for comparison.











09/06/2017 14:00


Head CT without Contrast





HISTORY: fall, h/o ICH





COMPARISON: 08/19/2017 





TECHNIQUE:


Axial computed tomography images were obtained through the head/brain without 

intravenous contrast.  





Radiation dose:





Total exam DLP = 711 mGy-cm.





This CT exam was performed using one or more of the following dose reduction 

techniques: Automated exposure control, adjustment of the mA and/or kV 

according to patient size, and/or use of iterative reconstruction technique.





FINDINGS:


HEMORRHAGE:


The previous study showed a large hemorrhagic infarct in the right parietal 

lobe measuring 4.4 cm in diameter.  Nonhemorrhagic infarcts were seen more 

anteriorly in the frontal lobe.


On the current study the right parietal hemorrhage has decreased in size and 

density. This now measures 3.6 cm in diameter. 


BRAIN: No significant mass effect.  There are no acute findings


VENTRICLES: Unremarkable. No hydrocephalus. 


CALVARIUM: Unremarkable.


PARANASAL SINUSES: Unremarkable as visualized. No significant inflammatory 

changes.


MASTOID AIR CELLS: Unremarkable as visualized. No inflammatory changes.


OTHER FINDINGS: None.





IMPRESSION:


Resolving hemorrhagic infarct the right parietal lobe and recent infarcts in 

the right frontal lobe.


No acute intracranial findings


Dictator : Calvin Roland MD








09/06/2017 14:30


Chest X-Ray


HISTORY:


r/o PNA


COMPARISON:


08/18/2017


FINDINGS:


LUNGS: Clear.


PLEURA: No pneumothorax or pleural fluid seen.


CARDIOVASCULAR: Normal.


OSSEOUS STRUCTURES: No significant abnormalities.


VISUALIZED UPPER ABDOMEN: Normal.


OTHER FINDINGS: None. 





IMPRESSION: No active disease.


Dictator : Calvin Roland MD





09/06/17 15:29


Leaving Against Medical Advice (AMA):


The patient is choosing to leave against medical advice.  I have personally 

explained to the patient that choosing to do so may result in permanent bodily 

harm or death.  I have discussed at great length that without further 

evaluation and monitoring there may be unforeseen circumstances and/or 

deterioration causing permanent bodily harm or death as a result of their 

choice. The patient is alert, oriented, and shows the mental capacity to make 

clear decisions regarding the patients health care at this time. The patient 

continues to wish to leave against medical advice.  The patient has been 

advised that they should return to the emergency room immediately if they 

change their mind at any time, or if their condition begins to change or worsen 

in any way.





Patient was heavily encouraged to stay in hospital by me, by PMD Dr. Johnson via 

phone due to the his chronic medical conditions and the lack of support at 

home.  who also spoke to patient via phone attempted to convince 

the patient to stay in the hospital and informed him that she was actively 

working on obtaining home care or NH placement but that in the interim, he was 

not safe to be at home alone. The patient understood all of this but refused to 

stay in the hospital and wished to leave against medical advice. He understood 

the risk of death or permanent disability.








- Lab Interpretations


Lab Results: 








 09/06/17 12:50 





 09/06/17 12:50 





 Lab Results





09/06/17 12:50: PT 11.6, INR 1.07, APTT 25.9


09/06/17 12:50: WBC 9.4  D, RBC 3.93, Hgb 11.6 L, Hct 31.8 L, MCV 80.9, MCH 29.5

, MCHC 36.5, RDW 14.9 H, Plt Count 308, MPV 10.7, Gran % 72.4 H, Lymph % (Auto) 

18.6 L, Mono % (Auto) 7.5 H, Eos % (Auto) 1.0 L, Baso % (Auto) 0.5, Gran # 6.80 

H, Lymph # 1.7, Mono # 0.7 H, Eos # 0.1, Baso # 0.05


09/06/17 12:50: Sodium 139, Potassium 3.9, Chloride 102, Carbon Dioxide 19 L, 

Anion Gap 22 H, BUN 26 H, Creatinine 2.7 H, Est GFR ( Amer) 29, Est GFR (

Non-Af Amer) 24, Random Glucose 135 H, Calcium 9.9, Total Bilirubin 0.9, AST 51

, ALT 32, Alkaline Phosphatase 117, Total Creatine Kinase 341 H, CK-MB (CK-2) 

4.7 H, CK-MB (CK-2) % Cancelled, Total Protein 7.2, Albumin 4.3, Globulin 2.9, 

Albumin/Globulin Ratio 1.5


09/06/17 12:07: POC Glucose (mg/dL) 129 H








I have reviewed the lab results: Yes





- RAD Interpretation


Radiology Orders: 








09/06/17 12:33


HEAD W/O CONTRAST [CT] Stat 


CHEST ONE VIEW [RAD] Stat 














- Scribe Statement


The provider has reviewed the documentation as recorded by the Scribe





Marianela Montelongo





Provider Scribe Provider Scribe Attestation:


All medical record entries made by the Scribe were at my direction and 

personally dictated by me. I have reviewed the chart and agree that the record 

accurately reflects my personal performance of the history, physical exam, 

medical decision making, and the department course for this patient. I have 

also personally directed, reviewed, and agree with the discharge instructions 

and disposition.














Disposition/Present on Arrival





- Present on Arrival


Any Indicators Present on Arrival: No


History of DVT/PE: No


History of Uncontrolled Diabetes: No


Urinary Catheter: No


History of Decub. Ulcer: No


History Surgical Site Infection Following: None





- Disposition


Have Diagnosis and Disposition been Completed?: Yes


Diagnosis: 


 Fall





Disposition: AGAINST MEDICAL ADVICE


Disposition Time: 15:00


Patient Plan: Discharge


Condition: GUARDED


Referrals: 


Suzette Briscoe MD [Primary Care Provider] - Follow up with primary


Forms:  [employer] (English)

## 2017-09-06 NOTE — RAD
PROCEDURE:  CHEST RADIOGRAPH, 1 VIEW



HISTORY:

r/o PNA



COMPARISON:

08/18/2017



FINDINGS:



LUNGS:

Clear.



PLEURA:

No pneumothorax or pleural fluid seen.



CARDIOVASCULAR:

Normal.



OSSEOUS STRUCTURES:

No significant abnormalities.



VISUALIZED UPPER ABDOMEN:

Normal.



OTHER FINDINGS:

None. 



IMPRESSION:

No active disease.

## 2017-09-06 NOTE — CARD
--------------- APPROVED REPORT --------------





EKG Measurement

Heart [id number]

RI [id number]

[id number]

[id number]

[id number]



<Conclusion>

Sinus rhythm with premature atrial complexes

Minimal voltage criteria for LVH, may be normal variant

Anteroseptal infarct, age undetermined

T wave abnormality, consider lateral ischemia

Abnormal ECG

## 2017-09-06 NOTE — CT
PROCEDURE:  CT HEAD WITHOUT CONTRAST.



HISTORY:

fall, h/o ICH



COMPARISON:

08/19/2017 



TECHNIQUE:

Axial computed tomography images were obtained through the head/brain 

without intravenous contrast.  



Radiation dose:



Total exam DLP = 711 mGy-cm.



This CT exam was performed using one or more of the following dose 

reduction techniques: Automated exposure control, adjustment of the 

mA and/or kV according to patient size, and/or use of iterative 

reconstruction technique.



FINDINGS:



HEMORRHAGE:

The previous study showed a large hemorrhagic infarct in the right 

parietal lobe measuring 4.4 cm in diameter.  Nonhemorrhagic infarcts 

were seen more anteriorly in the frontal lobe.



On the current study the right parietal hemorrhage has decreased in 

size and density. This now measures 3.6 cm in diameter. 



BRAIN:

No significant mass effect.  There are no acute findings



VENTRICLES:

Unremarkable. No hydrocephalus. 



CALVARIUM:

Unremarkable.



PARANASAL SINUSES:

Unremarkable as visualized. No significant inflammatory changes.



MASTOID AIR CELLS:

Unremarkable as visualized. No inflammatory changes.



OTHER FINDINGS:

None.



IMPRESSION:

Resolving hemorrhagic infarct the right parietal lobe and recent 

infarcts in the right frontal lobe.



No acute intracranial findings

## 2017-09-12 NOTE — DS
CHIEF COMPLAINT:  Fall, trauma.



HISTORY OF PRESENT ILLNESS:  Mr. Dawit Carpenter was a 62-year-old male,

very, very noncompliant, with a history of hypertension, diabetes mellitus,

who came to the emergency department status post unwitnessed fall.  The

patient was found on the floor in the kitchen by the home health aide.  On

the day of admission, the patient was very poor historian, unsure how he

fell and what happened to him and how long he was on the floor.  The

patient was unsure if he lost consciousness.  His blood sugar is always

elevated.  No fever or chills.  We admitted the patient and did CAT scan of

the head, chest x-rays, and repeat CAT scan.  Seen by the psychiatrist, Dr. Simona Damon; pulmonologist, Dr. Bingham; cardiologist, Dr. Stephen Camara. 

Seen by stroke specialist, Dr. Pardeep Mendoza; neurosurgeon, Dr. Dipak Parmar. 

The patient improved.  The patient was put in the unit, transferred to the

floor, improved, sent to [facility] with too much agitation.  Otherwise, he did

not want to go to [facility].  Every day, he was complaining that he wanted to go

home, but he was not able to do his ADL and his discharge was not safe. 

Education done and sent to [facility].  We will continue treatment

there.



PAST MEDICAL HISTORY:  Coronary artery disease; congestive heart failure;

hypertension; cardiac stenting 6 times in the heart; COPD; pneumonia;

legally blind; history of cataract; renal insufficiency; diabetes mellitus

type 2, needs insulin but he does not want insulin; amputated toes;

anxiety; depression.



FAMILY HISTORY:  Father and mother noncontributory.  As per him, he does

not have any family.



HABITS:  No smoking.  No drugs.  No ethanol.



ALLERGIES:  THE PATIENT IS ALLERGIC TO PENICILLIN.



REVIEW OF SYSTEMS:  The patient was seen and examined on the bedside on

08/23/2017.  No nausea, vomiting, diarrhea.  He feels better.  No headache.

No rhinitis.  No hematuria.  No hematochezia.  No swelling of the legs.



PHYSICAL EXAMINATION:

VITAL SIGNS:  Temperature 98, heart rate 64, respiratory rate 20, blood

pressure 157/77, and pulse oxymetry 99% on nasal cannula.

HEENT:  Head is normocephalic and atraumatic.  Eyes, PERRLA.  Extraocular

muscles intact.  Conjunctivae clear.  Nose patent.  Mucous membranes moist.

NECK:  Supple.  No carotid bruit.  No JVD or thyromegaly.

CHEST:  Bilaterally symmetrical.

HEART:  S1 and S2 positive.

LUNGS:  Clear to auscultation.

ABDOMEN:  Soft.  Bowel sounds positive.  No organomegaly.

EXTREMITIES:  No edema.  No cyanosis.

NEUROLOGIC:  The patient is awake and alert.  Moving all 4 extremities.  No

focal deficits.



MEDICATIONS:  Aspirin, Ativan, Colace, Imdur, Keppra, Levemir, Lipitor,

metoprolol, Protonix, and Tylenol.



LABORATORY DATA:  Hemoglobin 10.7, hematocrit 29.1, white blood cell 7.6,

platelets 313.  Sodium 146, potassium 2.4, BUN 28, creatinine 1.7, glucose

115.  AST 54, ALT 30.  Blood cultures are negative.



ASSESSMENT AND PLAN:  Mr. Dawit Carpenter is a 62-year-old male with

intracranial bleeding with ischemic stroke, chronic obstructive pulmonary

disease, coronary artery disease with cardiac stenting x6, anemia,

peripheral vascular disease with amputation of the toes, sleep apnea

syndrome, insulin-dependent diabetes mellitus type 2, but the patient is

not using insulin, renal insufficiency.  Fall precautions, getting physical

therapy.  Talked to the patient about home safety.  Discharged to subacute

rehabilitation.  We will continue care.  He will get physical therapy. 

Actually, the patient is not able to live by himself alone, but he want to

live by himself.  He is mentally competent.  We will follow up in subacute

rehabilitation.





__________________________________________

Suzette Briscoe MD





DD:  09/11/2017 9:56:05

DT:  09/11/2017 18:32:38

Job # [id number]

## 2018-03-21 ENCOUNTER — HOSPITAL ENCOUNTER (INPATIENT)
Dept: HOSPITAL 42 - ED | Age: 63
LOS: 2 days | Discharge: HOME | DRG: 247 | End: 2018-03-23
Attending: INTERNAL MEDICINE | Admitting: INTERNAL MEDICINE
Payer: MEDICARE

## 2018-03-21 VITALS — BODY MASS INDEX: 35.4 KG/M2

## 2018-03-21 DIAGNOSIS — Z91.14: ICD-10-CM

## 2018-03-21 DIAGNOSIS — N28.9: ICD-10-CM

## 2018-03-21 DIAGNOSIS — E11.40: ICD-10-CM

## 2018-03-21 DIAGNOSIS — I25.119: Primary | ICD-10-CM

## 2018-03-21 DIAGNOSIS — E11.65: ICD-10-CM

## 2018-03-21 DIAGNOSIS — J44.9: ICD-10-CM

## 2018-03-21 DIAGNOSIS — E66.9: ICD-10-CM

## 2018-03-21 DIAGNOSIS — I11.0: ICD-10-CM

## 2018-03-21 DIAGNOSIS — E78.00: ICD-10-CM

## 2018-03-21 DIAGNOSIS — D64.9: ICD-10-CM

## 2018-03-21 DIAGNOSIS — Z79.82: ICD-10-CM

## 2018-03-21 DIAGNOSIS — E11.319: ICD-10-CM

## 2018-03-21 DIAGNOSIS — E11.21: ICD-10-CM

## 2018-03-21 DIAGNOSIS — R12: ICD-10-CM

## 2018-03-21 DIAGNOSIS — I50.9: ICD-10-CM

## 2018-03-21 LAB
ALBUMIN SERPL-MCNC: 4.4 G/DL (ref 3–4.8)
ALBUMIN/GLOB SERPL: 1.3 {RATIO} (ref 1.1–1.8)
ALT SERPL-CCNC: 24 U/L (ref 7–56)
APPEARANCE UR: CLEAR
AST SERPL-CCNC: 23 U/L (ref 17–59)
BASOPHILS # BLD AUTO: 0.06 K/MM3 (ref 0–2)
BASOPHILS NFR BLD: 0.7 % (ref 0–3)
BILIRUB UR-MCNC: NEGATIVE MG/DL
BNP SERPL-MCNC: 1290 PG/ML (ref 0–450)
BUN SERPL-MCNC: 33 MG/DL (ref 7–21)
CALCIUM SERPL-MCNC: 9.7 MG/DL (ref 8.4–10.5)
COLOR UR: YELLOW
EOSINOPHIL # BLD: 0.2 10*3/UL (ref 0–0.7)
EOSINOPHIL NFR BLD: 1.8 % (ref 1.5–5)
EPI CELLS #/AREA URNS HPF: (no result) /HPF (ref 0–5)
ERYTHROCYTE [DISTWIDTH] IN BLOOD BY AUTOMATED COUNT: 14.4 % (ref 11.5–14.5)
GFR NON-AFRICAN AMERICAN: 30
GLUCOSE UR STRIP-MCNC: >=1000 MG/DL
GRANULOCYTES # BLD: 6.34 10*3/UL (ref 1.4–6.5)
GRANULOCYTES NFR BLD: 75.2 % (ref 50–68)
HGB BLD-MCNC: 13.3 G/DL (ref 14–18)
LEUKOCYTE ESTERASE UR-ACNC: NEGATIVE LEU/UL
LYMPHOCYTES # BLD: 1.4 10*3/UL (ref 1.2–3.4)
LYMPHOCYTES NFR BLD AUTO: 16.1 % (ref 22–35)
MCH RBC QN AUTO: 28.8 PG (ref 25–35)
MCHC RBC AUTO-ENTMCNC: 36.3 G/DL (ref 31–37)
MCV RBC AUTO: 79.2 FL (ref 80–105)
MONOCYTES # BLD AUTO: 0.5 10*3/UL (ref 0.1–0.6)
MONOCYTES NFR BLD: 6.2 % (ref 1–6)
PH UR STRIP: 6 [PH] (ref 4.7–8)
PLATELET # BLD: 299 10^3/UL (ref 120–450)
PMV BLD AUTO: 10.3 FL (ref 7–11)
PROT UR STRIP-MCNC: 30 MG/DL
RBC # BLD AUTO: 4.62 10^6/UL (ref 3.5–6.1)
RBC # UR STRIP: (no result) /UL
SP GR UR STRIP: 1.02 (ref 1–1.03)
TROPONIN I SERPL-MCNC: 0.02 NG/ML
UROBILINOGEN UR STRIP-ACNC: 0.2 E.U./DL
WBC # BLD AUTO: 8.4 10^3/UL (ref 4.5–11)
WBC #/AREA URNS HPF: (no result) /HPF (ref 0–6)

## 2018-03-21 RX ADMIN — INSULIN HUMAN SCH: 100 INJECTION, SOLUTION PARENTERAL at 22:26

## 2018-03-21 NOTE — CP.PCM.PN
Subjective





- Date & Time of Evaluation


Date of Evaluation: 03/21/18


Time of Evaluation: 21:48





- Subjective


Subjective: 


Patient was seen at bedside for his complaint of heart burn.


 Has no other complaints now.


 Denies chest pain , sob, nausea, vomiting, headache, dizziness.


 Medical record was reviewed.


 This 62 year old white male is admitted with intermittent chest pain, sob.


 Has PMH of 





Objective





- Vital Signs/Intake and Output


Vital Signs (last 24 hours): 


 











Temp Pulse Resp BP Pulse Ox


 


 98.1 F   53 L  18   148/53 L  96 


 


 03/21/18 20:18  03/21/18 20:18  03/21/18 20:18  03/21/18 20:18  03/21/18 20:18











- Medications


Medications: 


 Current Medications





Aspirin (Ecotrin)  81 mg PO DAILY MONTEZ


Cholecalciferol (Vitamin D)  2,000 intlu PO DAILY MONTEZ


Insulin Human Regular (Humulin R Low)  0 units SC ACHS MONTEZ


   PRN Reason: Protocol


Losartan Potassium (Cozaar)  25 mg PO DAILY MONTEZ


Paricalcitol [ Zemplar] 1 Mcg (Home Med)  1 mcg PO QOTHERDAY MONTEZ


Pantoprazole Sodium (Protonix Ec Tab)  40 mg PO STAT STA


   Stop: 03/21/18 21:48


Sitagliptin Phosphate (Januvia)  50 mg PO DAILY MONTEZ











- Labs


Labs: 





 Most Recent Lab Values











WBC  8.4 10^3/ul (4.5-11.0)   03/21/18  14:30    


 


RBC  4.62 10^6/uL (3.5-6.1)   03/21/18  14:30    


 


Hgb  13.3 g/dL (14.0-18.0)  L  03/21/18  14:30    


 


Hct  36.6 % (42.0-52.0)  L  03/21/18  14:30    


 


MCV  79.2 fl (80.0-105.0)  L  03/21/18  14:30    


 


MCH  28.8 pg (25.0-35.0)   03/21/18  14:30    


 


MCHC  36.3 g/dl (31.0-37.0)   03/21/18  14:30    


 


RDW  14.4 % (11.5-14.5)   03/21/18  14:30    


 


Plt Count  299 10^3/uL (120.0-450.0)   03/21/18  14:30    


 


MPV  10.3 fl (7.0-11.0)   03/21/18  14:30    


 


Gran %  75.2 % (50.0-68.0)  H  03/21/18  14:30    


 


Lymph % (Auto)  16.1 % (22.0-35.0)  L  03/21/18  14:30    


 


Mono % (Auto)  6.2 % (1.0-6.0)  H  03/21/18  14:30    


 


Eos % (Auto)  1.8 % (1.5-5.0)   03/21/18  14:30    


 


Baso % (Auto)  0.7 % (0.0-3.0)   03/21/18  14:30    


 


Gran #  6.34  (1.4-6.5)   03/21/18  14:30    


 


Lymph # (Auto)  1.4  (1.2-3.4)   03/21/18  14:30    


 


Mono # (Auto)  0.5  (0.1-0.6)   03/21/18  14:30    


 


Eos # (Auto)  0.2  (0.0-0.7)   03/21/18  14:30    


 


Baso # (Auto)  0.06 K/mm3 (0.0-2.0)   03/21/18  14:30    


 


Sodium  141 mmol/L (132-148)   03/21/18  14:30    


 


Potassium  4.6 mmol/L (3.6-5.0)   03/21/18  14:30    


 


Chloride  106 mmol/L ()   03/21/18  14:30    


 


Carbon Dioxide  22 mmol/L (21-33)   03/21/18  14:30    


 


Anion Gap  18  (10-20)   03/21/18  14:30    


 


BUN  33 mg/dL (7-21)  H  03/21/18  14:30    


 


Creatinine  2.2 mg/dl (0.8-1.5)  H  03/21/18  14:30    


 


Est GFR ( Amer)  37   03/21/18  14:30    


 


Est GFR (Non-Af Amer)  30   03/21/18  14:30    


 


POC Glucose (mg/dL)  187 mg/dL ()  H  03/21/18  22:00    


 


Random Glucose  297 mg/dL ()  H  03/21/18  14:30    


 


Calcium  9.7 mg/dL (8.4-10.5)   03/21/18  14:30    


 


Magnesium  1.9 mg/dL (1.7-2.2)   03/21/18  14:30    


 


Total Bilirubin  0.4 mg/dL (0.2-1.3)   03/21/18  14:30    


 


AST  23 U/L (17-59)   03/21/18  14:30    


 


ALT  24 U/L (7-56)   03/21/18  14:30    


 


Alkaline Phosphatase  82 U/L ()   03/21/18  14:30    


 


Lactate Dehydrogenase  536 U/L (333-699)   03/21/18  14:30    


 


Total Creatine Kinase  82 U/L ()   03/21/18  14:30    


 


Troponin I  0.02 ng/mL  03/21/18  22:14    


 


NT-Pro-B Natriuret Pep  1290 pg/mL (0-450)  H  03/21/18  14:30    


 


Total Protein  7.8 g/dL (5.8-8.3)   03/21/18  14:30    


 


Albumin  4.4 g/dL (3.0-4.8)   03/21/18  14:30    


 


Globulin  3.4 gm/dL  03/21/18  14:30    


 


Albumin/Globulin Ratio  1.3  (1.1-1.8)   03/21/18  14:30    


 


Urine Color  Yellow  (YELLOW)   03/21/18  16:15    


 


Urine Appearance  Clear  (CLEAR)   03/21/18  16:15    


 


Urine pH  6.0  (4.7-8.0)   03/21/18  16:15    


 


Ur Specific Gravity  1.025  (1.005-1.035)   03/21/18  16:15    


 


Urine Protein  30 mg/dL (<30 mg/dL)  H  03/21/18  16:15    


 


Urine Glucose (UA)  >=1000 mg/dL (NEGATIVE)   03/21/18  16:15    


 


Urine Ketones  Negative mg/dL (NEGATIVE)   03/21/18  16:15    


 


Urine Blood  Trace-intact  (NEGATIVE)  H  03/21/18  16:15    


 


Urine Nitrate  Negative  (NEGATIVE)   03/21/18  16:15    


 


Urine Bilirubin  Negative  (NEGATIVE)   03/21/18  16:15    


 


Urine Urobilinogen  0.2 E.U./dL (<1 E.U./dL)   03/21/18  16:15    


 


Ur Leukocyte Esterase  Negative Alex/uL (NEGATIVE)   03/21/18  16:15    


 


Urine RBC  1 - 3 /hpf (0-2)   03/21/18  16:15    


 


Urine WBC  0 - 2 /hpf (0-6)   03/21/18  16:15    


 


Ur Epithelial Cells  0 - 2 /hpf (0-5)   03/21/18  16:15    














- Constitutional


Appears: Well, No Acute Distress





- Head Exam


Head Exam: ATRAUMATIC, NORMAL INSPECTION, NORMOCEPHALIC


Additional comments: 





Obese, not in acute distress.





- Eye Exam


Eye Exam: Normal appearance





- ENT Exam


ENT Exam: Normal External Ear Exam





- Neck Exam


Neck Exam: Normal Inspection





- Respiratory Exam


Respiratory Exam: NORMAL BREATHING PATTERN





- Cardiovascular Exam


Cardiovascular Exam: absent: JVD





- GI/Abdominal Exam


GI & Abdominal Exam: absent: Distended





- Rectal Exam


Rectal Exam: Deferred





-  Exam


Additional comments: 





Deferred.





- Extremities Exam


Extremities Exam: Normal Inspection





- Back Exam


Back Exam: NORMAL INSPECTION





- Neurological Exam


Neurological Exam: Alert, Awake, Oriented x3





- Psychiatric Exam


Psychiatric exam: Normal Affect, Normal Mood





- Skin


Skin Exam: Normal Color





Assessment and Plan





- Assessment and Plan (Free Text)


Assessment: 





Heart burn.


Chest pain/SOB .


Obesity.


Borderline anemia.


Renal insufficiency.


Hyperglycemia.


Elevated BNP.


Plan: 





EKG stat.-----> NSR, poor R wave progression.


Troponin stat.


Protonix 40 mg PO stat.


Patient seen again:11:30 PM.


States that burning is gone, has still little sternal pain.Agreed to have 

mylanta 30 CC PO x 1.

## 2018-03-21 NOTE — CARD
--------------- APPROVED REPORT --------------





EKG Measurement

Heart [id number]

SC [id number]

[id number]

[id number]

[id number]



<Conclusion>



Normal sinus rhythm

Left anterior Julio-Block.

Moderate voltage criteria for LVH, may be normal variant.

Poor R Progression in V Leads may be related to Lt.Ant,Julio-BlockST_T 

Changes.

## 2018-03-21 NOTE — RAD
HISTORY:

chest pain  



COMPARISON:

09/06/2017 



FINDINGS:



LUNGS:

No active pulmonary disease.



PLEURA:

No significant pleural effusion identified, no pneumothorax apparent.



CARDIOVASCULAR:

Normal.



OSSEOUS STRUCTURES:

No significant abnormalities.



VISUALIZED UPPER ABDOMEN:

Normal.



OTHER FINDINGS:

None.



IMPRESSION:

No active disease.

## 2018-03-21 NOTE — ED PDOC
Arrival/HPI





- General


Chief Complaint: Chest Pain


Time Seen by Provider: 03/21/18 14:21


Historian: Patient





- History of Present Illness


Narrative History of Present Illness (Text): 


03/21/18 16:03


A 62 year old male presents to the emergency department complaining of 

intermittent chest pain for the past 4-5 days. Patient describes the pain as a 

non-radiating pressure sensation. Patient notes mild shortness of breath but 

denies any fever, chills, appetite changes, nausea, vomiting, abdominal pain, 

cough or any other complaints. Patient is complaint with his medications. 

Patients last catheterization was in 2014 with cardiologist Dr. Stephen Camara. 





Time/Duration: Other (4-5 days)


Symptom Course: Intermittent


Quality: Pressure


Context: Home





Past Medical History





- Provider Review


Nursing Documentation Reviewed: Yes





- Infectious Disease


Hx of Infectious Diseases: None





- Tetanus Immunization


Tetanus Immunization: Up to Date





- Cardiac


Hx Cardiac Disorders: Yes (CAD)


Hx Congestive Heart Failure: Yes


Hx Hypertension: Yes





- Pulmonary


Hx Respiratory Disorders: Yes


Hx Chronic Obstructive Pulmonary Disease (COPD): Yes





- Neurological


Hx Neurological Disorder: No





- HEENT


Hx HEENT Disorder: Yes (BLURRY VISION TO LEFT EYE)


Hx Blind: Yes (partial)


Hx Cataracts: Yes (Right eye)





- Renal


Hx Renal Disorder: Yes


Hx Renal Failure: Yes





- Endocrine/Metabolic


Hx Endocrine Disorders: Yes


Hx Diabetes Mellitus Type 2: Yes





- Hematological/Oncological


Hx Blood Disorders: No





- Integumentary


Hx Dermatological Disorder: Yes (SCARRING TO AMPUTATED RIGHT AND LEFT TOES.ALL 

TOES L.RIGHT 3 TOES.)


Other/Comment: mrsa bilateral feet





- Musculoskeletal/Rheumatological


Hx Musculoskeletal Disorders: Yes


Hx Arthritis: Yes


Hx Falls: Yes





- Gastrointestinal


Hx Gastrointestinal Disorders: Yes


Hx Gall Bladder Disease: Yes





- Genitourinary/Gynecological


Hx Genitourinary Disorders: No





- Psychiatric


Hx Psychophysiologic Disorder: Yes


Hx Anxiety: Yes


Hx Depression: Yes


Hx Substance Use: No





- Surgical History


Hx Amputation: Yes (of toes on both feet)


Hx Cardiac Catheterization: Yes


Hx Cholecystectomy: Yes


Hx Coronary Stent: Yes (6)


Hx Gastric Bypass Surgery: No


Hx Orthopedic Surgery: Yes (LEFT FT ALL TOES  AMP)


Other/Comment: Amputation of 3 toes right foot. Amputation of left foot toes.





- Anesthesia


Hx Anesthesia: Yes


Hx Anesthesia Reactions: No


Hx Malignant Hyperthermia: No





- Suicidal Assessment


Feels Threatened In Home Enviroment: No





Family/Social History





- Physician Review


Nursing Documentation Reviewed: Yes


Family/Social History: No Known Family HX


Smoking Status: Never Smoked


Hx Alcohol Use: No


Hx Substance Use: No


Hx Substance Use Treatment: No





Allergies/Home Meds


Allergies/Adverse Reactions: 


Allergies





Penicillins Allergy (Verified 03/21/18 14:24)


 RASH








Home Medications: 


 Home Meds











 Medication  Instructions  Recorded  Confirmed


 


Aspirin [Ecotrin] 81 mg PO DAILY 08/18/17 03/21/18


 


Cholecalciferol (Vitamin D3) 2,000 unit PO DAILY 03/21/18 03/21/18





[Vitamin D3]   


 


Losartan Potassium 25 mg PO DAILY 03/21/18 03/21/18


 


Paricalcitol [Zemplar] 1 mcg PO QOTHERDAY 03/21/18 03/21/18


 


SITagliptin [Januvia] 50 mg PO DAILY 03/21/18 03/21/18














Review of Systems





- Physician Review


All systems were reviewed & negative as marked: Yes





- Review of Systems


Constitutional: absent: Fevers, Night Sweats


Respiratory: SOB.  absent: Cough


Cardiovascular: Chest Pain


Gastrointestinal: absent: Abdominal Pain, Nausea, Vomiting, Appetite Changes





Physical Exam


Vital Signs Reviewed: Yes


Vital Signs











  Temp Pulse Pulse Resp BP Pulse Ox


 


 03/21/18 20:18  98.1 F  53 L   18  148/53 L  96


 


 03/21/18 17:30   56 L   17  158/83 H  98


 


 03/21/18 15:33  98.0 F  56 L   18  155/79 H  97


 


 03/21/18 14:40    58 L   











Temperature: Afebrile


Blood Pressure: Hypertensive


Pulse: Bradycardic


Respiratory Rate: Normal


Appearance: Positive for: Well-Appearing, Non-Toxic, Comfortable


Pain Distress: None


Mental Status: Positive for: Alert and Oriented X 3





- Systems Exam


Head: Present: Atraumatic, Normocephalic


Pupils: Present: PERRL


Extroacular Muscles: Present: EOMI


Conjunctiva: Present: Normal


Mouth: Present: Moist Mucous Membranes


Neck: Present: Normal Range of Motion


Respiratory/Chest: Present: Clear to Auscultation, Good Air Exchange.  No: 

Respiratory Distress, Accessory Muscle Use


Cardiovascular: Present: Regular Rate and Rhythm, Normal S1, S2.  No: Murmurs


Abdomen: Present: Normal Bowel Sounds.  No: Tenderness, Distention, Peritoneal 

Signs


Back: Present: Normal Inspection


Upper Extremity: Present: Normal Inspection.  No: Cyanosis, Edema


Lower Extremity: Present: Normal Inspection.  No: Edema


Neurological: Present: GCS=15, CN II-XII Intact, Speech Normal


Skin: Present: Warm, Dry, Normal Color.  No: Rashes


Psychiatric: Present: Alert, Oriented x 3, Normal Insight, Normal Concentration





Medical Decision Making


ED Course and Treatment: 


03/21/18 16:03


Impression:


A 62 year old male with intermittent non-radiating chest pressure. Patient 

notes mild shortness of breath.





Differential Diagnosis included but are not limited to:  





Plan:


-- Chest xray


-- EKG


-- Labs


-- Urinalysis 


-- Aspirin


-- Reassess and disposition





Progress Notes:


EKG shows NSR at 63 BPM with LAD, T-wave inversions in lateral leads. 

Interpreted by me.





Report Date : 03/21/2018 14:55:39


Procedure: Chest xray


Dictator : Calvin Roland MD


IMPRESSION: No active disease.





Case discussed with Dr. Briscoe, agrees with admission to telemetry under her 

service. Patient aware of and in agreement with plan.








- Lab Interpretations


Lab Results: 








 03/21/18 14:30 





 03/21/18 14:30 





 Lab Results





03/21/18 14:30: Sodium 141, Potassium 4.6, Chloride 106, Carbon Dioxide 22, 

Anion Gap 18, BUN 33 H, Creatinine 2.2 H, Est GFR ( Amer) 37, Est GFR (

Non-Af Amer) 30, Random Glucose 297 H, Calcium 9.7, Magnesium 1.9, Total 

Bilirubin 0.4, AST 23, ALT 24, Alkaline Phosphatase 82, Lactate Dehydrogenase 

536, Total Creatine Kinase 82, Troponin I 0.02  D, NT-Pro-B Natriuret Pep 1290 H

, Total Protein 7.8, Albumin 4.4, Globulin 3.4, Albumin/Globulin Ratio 1.3


03/21/18 14:30: WBC 8.4, RBC 4.62, Hgb 13.3 L, Hct 36.6 L, MCV 79.2 L, MCH 28.8

, MCHC 36.3, RDW 14.4, Plt Count 299, MPV 10.3, Gran % 75.2 H, Lymph % (Auto) 

16.1 L, Mono % (Auto) 6.2 H, Eos % (Auto) 1.8, Baso % (Auto) 0.7, Gran # 6.34, 

Lymph # (Auto) 1.4, Mono # (Auto) 0.5, Eos # (Auto) 0.2, Baso # (Auto) 0.06








I have reviewed the lab results: Yes





- RAD Interpretation


Radiology Orders: 








03/21/18 14:27


CHEST PORTABLE [RAD] Stat 














- Medication Orders


Current Medication Orders: 








Aspirin (Ecotrin)  81 mg PO DAILY MONTEZ


Cholecalciferol (Vitamin D)  2,000 intlu PO DAILY MONTEZ


Insulin Human Regular (Humulin R Low)  0 units SC ACHS MONTEZ


   PRN Reason: Protocol


Losartan Potassium (Cozaar)  25 mg PO DAILY MONTEZ


Paricalcitol [ Zemplar] 1 Mcg (Home Med)  1 mcg PO QOTHERDAY MONTEZ


Sitagliptin Phosphate (Januvia)  50 mg PO DAILY MONTEZ





Discontinued Medications





Aspirin (Aspirin)  325 mg PO STAT STA


   Stop: 03/21/18 14:27


   Last Admin: 03/21/18 14:54  Dose: 325 mg











- Scribe Statement


The provider has reviewed the documentation as recorded by the Emmett Benito





Provider Scribe Attestation:


All medical record entries made by the Tonioibflavia were at my direction and 

personally dictated by me. I have reviewed the chart and agree that the record 

accurately reflects my personal performance of the history, physical exam, 

medical decision making, and the department course for this patient. I have 

also personally directed, reviewed, and agree with the discharge instructions 

and disposition.








Disposition/Present on Arrival





- Present on Arrival


Any Indicators Present on Arrival: No


History of DVT/PE: No


History of Uncontrolled Diabetes: No


Urinary Catheter: No


History of Decub. Ulcer: No


History Surgical Site Infection Following: None





- Disposition


Have Diagnosis and Disposition been Completed?: Yes


Diagnosis: 


 Chest pain





Disposition: HOSPITALIZED


Disposition Time: 15:20


Condition: STABLE

## 2018-03-22 LAB
% IRON SATURATION: 53 % (ref 20–55)
BUN SERPL-MCNC: 32 MG/DL (ref 7–21)
CALCIUM SERPL-MCNC: 9.5 MG/DL (ref 8.4–10.5)
ERYTHROCYTE [DISTWIDTH] IN BLOOD BY AUTOMATED COUNT: 14.3 % (ref 11.5–14.5)
FOLATE SERPL-MCNC: 9.2 NG/ML
GFR NON-AFRICAN AMERICAN: 34
HDLC SERPL-MCNC: 35 MG/DL (ref 29–60)
HGB BLD-MCNC: 12.6 G/DL (ref 14–18)
IRON SERPL-MCNC: 141 UG/DL (ref 45–180)
LDLC SERPL-MCNC: 80 MG/DL (ref 0–129)
MCH RBC QN AUTO: 28.1 PG (ref 25–35)
MCHC RBC AUTO-ENTMCNC: 35.6 G/DL (ref 31–37)
MCV RBC AUTO: 79 FL (ref 80–105)
PLATELET # BLD: 258 10^3/UL (ref 120–450)
PMV BLD AUTO: 10.4 FL (ref 7–11)
RBC # BLD AUTO: 4.48 10^6/UL (ref 3.5–6.1)
TIBC SERPL-MCNC: 266 UG/DL (ref 261–462)
TROPONIN I SERPL-MCNC: 0.03 NG/ML
VIT B12 SERPL-MCNC: 316 PG/ML (ref 239–931)
WBC # BLD AUTO: 6.8 10^3/UL (ref 4.5–11)

## 2018-03-22 PROCEDURE — B211YZZ FLUOROSCOPY OF MULTIPLE CORONARY ARTERIES USING OTHER CONTRAST: ICD-10-PCS | Performed by: INTERNAL MEDICINE

## 2018-03-22 PROCEDURE — B215YZZ FLUOROSCOPY OF LEFT HEART USING OTHER CONTRAST: ICD-10-PCS | Performed by: INTERNAL MEDICINE

## 2018-03-22 PROCEDURE — 027135Z DILATION OF CORONARY ARTERY, TWO ARTERIES WITH TWO DRUG-ELUTING INTRALUMINAL DEVICES, PERCUTANEOUS APPROACH: ICD-10-PCS | Performed by: INTERNAL MEDICINE

## 2018-03-22 PROCEDURE — 4A023N7 MEASUREMENT OF CARDIAC SAMPLING AND PRESSURE, LEFT HEART, PERCUTANEOUS APPROACH: ICD-10-PCS | Performed by: INTERNAL MEDICINE

## 2018-03-22 PROCEDURE — 4A033BC MEASUREMENT OF ARTERIAL PRESSURE, CORONARY, PERCUTANEOUS APPROACH: ICD-10-PCS | Performed by: INTERNAL MEDICINE

## 2018-03-22 RX ADMIN — INSULIN HUMAN SCH: 100 INJECTION, SOLUTION PARENTERAL at 22:03

## 2018-03-22 RX ADMIN — VITAMIN D, TAB 1000IU (100/BT) SCH INTLU: 25 TAB at 09:11

## 2018-03-22 RX ADMIN — INSULIN HUMAN SCH UNITS: 100 INJECTION, SOLUTION PARENTERAL at 12:15

## 2018-03-22 RX ADMIN — INSULIN HUMAN SCH UNITS: 100 INJECTION, SOLUTION PARENTERAL at 08:06

## 2018-03-22 NOTE — CON
DATE:



HISTORY OF PRESENT ILLNESS:  The patient is 62-year-old  male with

long history of chest pain.  Patient was admitted on the medical side for

evaluation of chest pain and pressure-like sensation in his chest area. 

Psych consult was called for evaluation of possible anxiety symptoms. 

Patient was seen and examined today.  Patient presented to be alert and

oriented, pleasant, cooperative.  Patient is aware of the circumstances of

his admission to the medical side.  Patient does not remember this writer

from before.  This writer was involved into the consultation services for

this patient in 08/2017.  By the time patient _____ was making

inappropriate sexual remarks, but patient did not present this way today. 

Patient said that he was feeling lonely, but denied feeling depressed. 

Denied any thoughts of harming himself or others.  Patient wants to go to

[facility].  Patient reported that he lives independently.  He

likes his apartment, but after his home health aide's leaving, he feels

very lonely.  Patient denied hearing voices, denied seeing things, denied

paranoid ideation.  No suicidal ideation reported or observed.



VITAL SIGNS:  Seems to be stable.  Temperature 98.3, pulse is 55, blood

pressure 126/69, respirations 16, oxygen saturation is 97.



MEDICATIONS:  Reviewed.  Aspirin, Lipitor, vitamin D, Plavix, Humulin,

Cozaar, Lopressor, Januvia, sodium chloride.



LABORATORY DATA:  Reviewed.  Most recent was from today, hemoglobin 12,

hematocrit 35.  Chemistry also reviewed.  BUN at present seems to be

elevated.  Hemoglobin A1c 9.3.  Urinalysis, blood trace.  Collateral

information was obtained from the nursing staff.  As per nursing staff,

patient is compliant with the medication and treatment.  No signs of

agitation or aggression.  Pleasant, cooperative.



PAST PSYCHIATRIC HISTORY:  Patient denied history of being admitted to the

psychiatric inpatient unit.  Denied history of suicidal attempts.



MENTAL STATUS EXAMINATION:  Patient appears to be alert and oriented,

pleasant.  At times, patient appears to be with circumstantial and

tangential thought process.  Mood described as "at times, I feel lonely." 

Thought process seems to be goal directed, at times circumstantial. 

Thought content, patient denied visual, auditory or tactile hallucinations.

Denied paranoid ideation.  Patient denied thoughts of harming himself or

others.  Denied intent or plan.  In regards of anxiety, patient reported at

times he feels overly anxious, but denied this anxiety is of interfering

with his daily activities.



IMPRESSION:  Rule out anxiety due to general medical condition, rule out

adjustment disorder with anxious mood.



PLAN:  Continue current management.  Continue current medications.  There

is no need for the patient to be on any psychotropic medications.  The

patient might benefit from going to [facility].  Social

work evaluation recommended.  This writer will sign off from this case. 

Should you have any questions, give me a call back.  The patient not in any

imminent danger to self or to hurt self or others.  We will sign off. 

Thank you very much.







__________________________________________

Simona Damon MD



DD:  03/22/2018 14:56:53

DT:  03/22/2018 15:03:57

Job # [id number]

## 2018-03-22 NOTE — CARDCATH
PROCEDURE DATE:  03/22/2018



HISTORY:  The patient is a 62-year-old male with multiple cardiac risk

factors including diabetes mellitus, hypertension with known coronary

artery disease, who presents with exertional angina and shortness of

breath.



His troponin's were intermediate.



Because of this, cardiac catheterization was recommended.



The patient was pretreated with IV bicarb as well as aggressive IV

hydration because of his creatinine of 2.



PROCEDURES:  Left heart catheterization with coronary arteriography, left

ventriculogram, fractional flow reserve, followed by percutaneous

transluminal coronary angioplasty and stent of the circumflex artery as

well as percutaneous transluminal coronary angioplasty and stent of the

ostial RCA.



The right femoral artery was cannulated with 6-Arabic sheath.  There were

no complications.



I performed moderate sedation which included the presence of an independent

trained observer who assisted in monitoring the patient's level of

consciousness and physiologic status.  After administration of Versed and

fentanyl, my intra-service time was 30 minutes.



The findings on catheterization revealed a left ventricle that revealed an

akinetic anterior apex area.



Inferior wall moved well.



Estimated ejection fraction is 35% to 40%.



His left main artery revealed intimal irregularities without critical

lesions.



The LAD was occluded at its ostium, which is chronic.



The circumflex artery consisted of an obtuse marginal branch.  At the

takeoff of the obtuse marginal branch, there was a 70% stenosis noted.



The RCA was selectively cannulized and found to be a dominant vessel. 

There were diffuse intimal irregularities throughout its course.  In

addition, there was 80% stenoses in the ostium of the RCA.



The patient started on intravenous Angiomax on the fluoroscopic guide, the

guiding catheter was placed in the ostium of the left main artery.  An FFR

wire was placed across the circumflex lesion.  The FFR was measured to be

0.82.



This was followed by using a 2.75 x 12 mm drug-eluting stent at 14

atmospheres of pressure in the circumflex lesion.  After balloon deflation

and removal, repeat coronary arteriography revealed an excellent result

with no residual stenosis and CRISTY III flow.



The wire was then exchanged for a 3.5 right guider which was placed at the

ostium of the RCA.  A 0.014 ATW wire was used to cross the lesion.



A 3.5 x 9 mm drug-eluting stent was placed and deployed at the ostium of

the RCA.  Repeat coronary arteriography revealed an excellent result with

no residual stenosis and CRISTY III flow.



Angio-Seal was used to close the femoral artery site.  The patient

tolerated the procedure well.



In summary, the procedure was successful for PTCA and stent of a critically

stenosed mid circumflex lesion as well as a PTCA and stent of an ostial RCA

lesion, using drug-eluting stents.  The circumflex lesion was measured to

have an FFR of 0.82, the ostial RCA lesion revealed 80% obstructive lesion.



Cardiac catheterization reveals an anterior apex hypokinesis with an EF of

35% to 40%.  Coronary arteriography revealed a chronically occluded LAD

with stents in the new lesions in the circumflex and the RCA.



Given these findings, the patient will remain on aspirin indefinitely and

Plavix for a year and undergo a strict cardiac risk reduction program.  We

will continue aggressive IV hydration until the end of the night.







__________________________________________

Stephen Camara MD



DD:  03/22/2018 14:34:26

DT:  03/22/2018 14:39:53

Job # [id number]

## 2018-03-22 NOTE — CARD
--------------- APPROVED REPORT --------------





EKG Measurement

Heart [id number]

NJ 196P43

[id number]

[id number]

[id number]



<Conclusion>

Sinus bradycardia

Septal infarct, age undetermined

T wave abnormality, consider lateral ischemia

Abnormal ECG

## 2018-03-22 NOTE — CARD
--------------- APPROVED REPORT --------------





EKG Measurement

Heart [id number]

OR 180P47

[id number]

[id number]

[id number]



<Conclusion>

Normal sinus rhythm

Lt.Ant.Julio-Block.

Poor R Progression in V Leads may be related to Lt.Ant.Julio-Block.

T Inversion in  I, AVL.

## 2018-03-23 VITALS
SYSTOLIC BLOOD PRESSURE: 138 MMHG | RESPIRATION RATE: 16 BRPM | HEART RATE: 45 BPM | DIASTOLIC BLOOD PRESSURE: 62 MMHG | TEMPERATURE: 97.7 F

## 2018-03-23 VITALS — OXYGEN SATURATION: 96 %

## 2018-03-23 LAB
BASOPHILS # BLD AUTO: 0.07 K/MM3 (ref 0–2)
BASOPHILS NFR BLD: 1 % (ref 0–3)
BUN SERPL-MCNC: 28 MG/DL (ref 7–21)
CALCIUM SERPL-MCNC: 9.8 MG/DL (ref 8.4–10.5)
EOSINOPHIL # BLD: 0.2 10*3/UL (ref 0–0.7)
EOSINOPHIL NFR BLD: 2.5 % (ref 1.5–5)
ERYTHROCYTE [DISTWIDTH] IN BLOOD BY AUTOMATED COUNT: 14.1 % (ref 11.5–14.5)
GFR NON-AFRICAN AMERICAN: 36
GRANULOCYTES # BLD: 5.32 10*3/UL (ref 1.4–6.5)
GRANULOCYTES NFR BLD: 74.8 % (ref 50–68)
HGB BLD-MCNC: 13 G/DL (ref 14–18)
LYMPHOCYTES # BLD: 1 10*3/UL (ref 1.2–3.4)
LYMPHOCYTES NFR BLD AUTO: 14.3 % (ref 22–35)
MCH RBC QN AUTO: 28.5 PG (ref 25–35)
MCHC RBC AUTO-ENTMCNC: 36.1 G/DL (ref 31–37)
MCV RBC AUTO: 78.9 FL (ref 80–105)
MONOCYTES # BLD AUTO: 0.5 10*3/UL (ref 0.1–0.6)
MONOCYTES NFR BLD: 7.4 % (ref 1–6)
PLATELET # BLD: 285 10^3/UL (ref 120–450)
PMV BLD AUTO: 10 FL (ref 7–11)
RBC # BLD AUTO: 4.56 10^6/UL (ref 3.5–6.1)
WBC # BLD AUTO: 7.1 10^3/UL (ref 4.5–11)

## 2018-03-23 RX ADMIN — VITAMIN D, TAB 1000IU (100/BT) SCH INTLU: 25 TAB at 09:12

## 2018-03-23 RX ADMIN — INSULIN HUMAN SCH UNITS: 100 INJECTION, SOLUTION PARENTERAL at 08:03

## 2018-03-23 RX ADMIN — INSULIN HUMAN SCH UNITS: 100 INJECTION, SOLUTION PARENTERAL at 12:09

## 2018-03-23 NOTE — HP
CHIEF COMPLAINT:  Chest pain.



HISTORY OF PRESENT ILLNESS:  Mr. Dawit Carpenter, 62-year-old male, my

private patient, with history of multiple medical problems.  Came to

[facility] emergency room department complaining of intermittent

chest pain for 4 to 5 days.  The patient described the pain as nonradiating

pressure sensation.  The patient notes mild shortness of breath, but denies

any fever, chills, appetite change, nausea, vomiting, abdominal pain,

cough, fever or chills.  The patient's last catheterization was done in

2014 by cardiologist, Dr. Stephen Camara.  The patient is noncompliant with

medications and office visits.



PAST MEDICAL HISTORY:  As above, coronary artery disease, congestive heart

failure, hypertension, COPD, blurring of vision in left eye, partially

blind, cataract in the right eye, renal insufficiency, diabetes mellitus

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

depression, cholecystectomy, coronary stents x6.



FAMILY HISTORY:  Father and mother, noncontributory.



HABITS:  Never smoked.  No drugs.  No ethanol.



ALLERGIES:  THE PATIENT IS ALLERGIC TO PENICILLIN.



HOME MEDICATIONS:  Ecotrin, vitamin D, losartan, Zemplar, Januvia.



REVIEW OF SYSTEMS:  The patient is seen and examined in the bedside in his

room early in the morning.  Waiting for catheterization.  Still having

chest pain off and on.  No fever.  No chills.  No nausea, vomiting,

diarrhea.  No hematuria or hematochezia.  No swelling of the legs.  The

patient has intermittent chest pain.  No headache.  No dysuria, but getting

shortness of breath upon just walking few feet.



PHYSICAL EXAMINATION:

VITAL SIGNS:  Temperature 97.8, pulse 56, blood pressure 135/112.

HEENT:  Head normocephalic, atraumatic.  Eyes PERRLA.  Extraocular muscles

intact.  Conjunctivae clear.  Nose patent.

NECK:  Supple.  No carotid bruit.  No JVD or thyromegaly.

CHEST:  Bilaterally symmetrical.

HEART:  S1 and S2 positive.

LUNGS:  Clear to auscultation.

ABDOMEN:  Soft.  Bowel sounds positive.  No organomegaly.

EXTREMITIES:  No edema.  No cyanosis.

NEUROLOGICAL:  The patient is awake and alert.  Moving all 4 extremities. 

No focal deficits.



LABORATORY DATA:  White blood cells 6.8, hemoglobin 12.6, hematocrit 35.4,

platelets 258.  Sodium 140, potassium 4.6, BUN 32, creatinine 2.0, glucose

236.  Hemoglobin A1c is 9.3.



ASSESSMENT AND PLAN:  Mr. Dawit Carpenter, a 62-year-old male, with

anemia, hyperchloremia, renal insufficiency.  The patient has diabetic

retinopathy, diabetic nephropathy, diabetic neuropathy.  Hemoglobin A1c is

9.3.  Seen by cardiologist, Dr. Stephen Camara and went for catheterization. 

According to Dr. Stephen Camara, the patient had coronary artery disease.  He

put stents.  Started the patient on fingerstick, aspirin, losartan,

Januvia, Lipitor.  Gastrointestinal, deep venous thrombosis prophylaxis. 

Repeat labs.  We will follow up.





__________________________________________

Suzette Briscoe MD





DD:  03/22/2018 21:19:46

DT:  03/23/2018 0:58:16

Job # [id number]

## 2018-03-23 NOTE — PN
DATE:  03/23/2018



SUBJECTIVE:  The patient is ambulating without symptoms.



PHYSICAL EXAMINATION:

VITAL SIGNS:  Blood pressure 146/81, heart rate in the 60s.

NECK:  Negative JVD.

LUNGS:  Without rales.

HEART:  With S1, S2.

EXTREMITIES:  Without edema.



LABORATORY DATA:  Hemoglobin is 13.  Chemistries, BUN and creatinine is 28

and 1.9.



IMPRESSION:

1.  Stable post percutaneous transluminal coronary angioplasty and stent,

critical stenosis of circumflex artery as well as percutaneous transluminal

coronary angioplasty and stent of an ostial right coronary artery.

2.  Hypertension.

3.  Hypercholesterolemia.

4.  Renal insufficiency.

5.  Diabetes mellitus.



PLAN:  Given these findings, the patient is stable for discharge.  Follow

up and instructions were given to patient.  Medications have been reviewed.

We will enrol the patient in cardiac rehab.





__________________________________________

Stephen Camara MD



DD:  03/23/2018 10:30:36

DT:  03/23/2018 10:54:59

Job # [id number]

## 2018-05-07 ENCOUNTER — HOSPITAL ENCOUNTER (EMERGENCY)
Dept: HOSPITAL 42 - ED | Age: 63
Discharge: HOME | End: 2018-05-07
Payer: MEDICARE

## 2018-05-07 VITALS
TEMPERATURE: 97.5 F | SYSTOLIC BLOOD PRESSURE: 158 MMHG | HEART RATE: 54 BPM | DIASTOLIC BLOOD PRESSURE: 69 MMHG | RESPIRATION RATE: 19 BRPM

## 2018-05-07 VITALS — OXYGEN SATURATION: 99 %

## 2018-05-07 VITALS — BODY MASS INDEX: 36.5 KG/M2

## 2018-05-07 DIAGNOSIS — N18.9: ICD-10-CM

## 2018-05-07 DIAGNOSIS — E11.22: ICD-10-CM

## 2018-05-07 DIAGNOSIS — I12.9: Primary | ICD-10-CM

## 2018-05-07 LAB
ALBUMIN SERPL-MCNC: 4.3 G/DL (ref 3–4.8)
ALBUMIN/GLOB SERPL: 1.4 {RATIO} (ref 1.1–1.8)
ALT SERPL-CCNC: 30 U/L (ref 7–56)
APAP SERPL-MCNC: < 10 UG/ML (ref 10–20)
AST SERPL-CCNC: 26 U/L (ref 17–59)
BASOPHILS # BLD AUTO: 0.05 K/MM3 (ref 0–2)
BASOPHILS NFR BLD: 0.6 % (ref 0–3)
BUN SERPL-MCNC: 37 MG/DL (ref 7–21)
CALCIUM SERPL-MCNC: 9.4 MG/DL (ref 8.4–10.5)
EOSINOPHIL # BLD: 0.2 10*3/UL (ref 0–0.7)
EOSINOPHIL NFR BLD: 2.2 % (ref 1.5–5)
ERYTHROCYTE [DISTWIDTH] IN BLOOD BY AUTOMATED COUNT: 13.6 % (ref 11.5–14.5)
GFR NON-AFRICAN AMERICAN: 27
GRANULOCYTES # BLD: 5.8 10*3/UL (ref 1.4–6.5)
GRANULOCYTES NFR BLD: 75.4 % (ref 50–68)
HGB BLD-MCNC: 12.5 G/DL (ref 14–18)
LYMPHOCYTES # BLD: 1.2 10*3/UL (ref 1.2–3.4)
LYMPHOCYTES NFR BLD AUTO: 16 % (ref 22–35)
MCH RBC QN AUTO: 28.6 PG (ref 25–35)
MCHC RBC AUTO-ENTMCNC: 35.6 G/DL (ref 31–37)
MCV RBC AUTO: 80.3 FL (ref 80–105)
MONOCYTES # BLD AUTO: 0.5 10*3/UL (ref 0.1–0.6)
MONOCYTES NFR BLD: 5.8 % (ref 1–6)
PLATELET # BLD: 246 10^3/UL (ref 120–450)
PMV BLD AUTO: 10.7 FL (ref 7–11)
RBC # BLD AUTO: 4.37 10^6/UL (ref 3.5–6.1)
SALICYLATES SERPL-MCNC: < 1 MG/DL (ref 2–20)
WBC # BLD AUTO: 7.7 10^3/UL (ref 4.5–11)

## 2018-05-07 PROCEDURE — 93005 ELECTROCARDIOGRAM TRACING: CPT

## 2018-05-07 PROCEDURE — 80053 COMPREHEN METABOLIC PANEL: CPT

## 2018-05-07 PROCEDURE — 85025 COMPLETE CBC W/AUTO DIFF WBC: CPT

## 2018-05-07 PROCEDURE — 71045 X-RAY EXAM CHEST 1 VIEW: CPT

## 2018-05-07 PROCEDURE — 99282 EMERGENCY DEPT VISIT SF MDM: CPT

## 2018-05-07 PROCEDURE — 90791 PSYCH DIAGNOSTIC EVALUATION: CPT

## 2018-05-07 NOTE — RAD
HISTORY:

psych eval  



COMPARISON:

03/21/2018 



FINDINGS:



LUNGS:

No active pulmonary disease.



PLEURA:

No significant pleural effusion identified, no pneumothorax apparent.



CARDIOVASCULAR:

Normal.



OSSEOUS STRUCTURES:

No significant abnormalities.



VISUALIZED UPPER ABDOMEN:

Normal.



OTHER FINDINGS:

None.



IMPRESSION:

No active disease.

## 2018-05-07 NOTE — ED PDOC
Arrival/HPI





- General


Chief Complaint: Psychiatric Evaluation


Time Seen by Provider: 05/07/18 14:00


Historian: Patient





- History of Present Illness


Narrative History of Present Illness (Text): 





05/07/18 14:38


62 year old male, who presents to the emergency department complaining of 

feeling depressed. Patient reports his care taker was talking bad about his 

mother and he became angry. Patient is currently asymptomatic and denies SI or 

HI. No other complaints were made.


Time/Duration: Prior to Arrival


Symptom Onset: Sudden


Symptom Course: Unchanged


Activities at Onset: Emotional Upset





Past Medical History





- Provider Review


Nursing Documentation Reviewed: Yes





- Infectious Disease


Hx of Infectious Diseases: None





- Tetanus Immunization


Tetanus Immunization: Up to Date





- Cardiac


Hx Congestive Heart Failure: Yes


Hx Hypertension: Yes


Hx Peripheral Vascular Disease: Yes





- Pulmonary


Hx Chronic Obstructive Pulmonary Disease (COPD): Yes


Hx Pneumonia: Yes





- Neurological


Hx Neurological Disorder: No





- HEENT


Hx Blind: Yes (Right eye due to cataract)


Hx Cataracts: Yes (Right eye)





- Renal


Hx Renal Failure: Yes





- Endocrine/Metabolic


Hx Diabetes Mellitus Type 2: Yes





- Hematological/Oncological


Hx Blood Disorders: No





- Integumentary


Hx Dermatological Disorder: Yes (SCARRING TO AMPUTATED RIGHT AND LEFT TOES.ALL 

TOES L.RIGHT 3 TOES.)


Other/Comment: mrsa bilateral feet





- Musculoskeletal/Rheumatological


Hx Arthritis: Yes


Hx Falls: Yes





- Gastrointestinal


Hx Gastrointestinal Disorders: Yes


Hx Gall Bladder Disease: Yes





- Genitourinary/Gynecological


Hx Genitourinary Disorders: No





- Psychiatric


Hx Anxiety: Yes


Hx Depression: Yes


Hx Substance Use: No





- Surgical History


Hx Amputation: Yes (Toes on left foot, last two toes on right)


Hx Cholecystectomy: Yes


Hx Coronary Stent: Yes (x6)





- Anesthesia


Hx Anesthesia: Yes


Hx Anesthesia Reactions: No


Hx Malignant Hyperthermia: No





- Suicidal Assessment


Feels Threatened In Home Enviroment: No





Family/Social History





- Physician Review


Nursing Documentation Reviewed: Yes


Family/Social History: Unknown Family HX


Smoking Status: Never Smoked


Hx Alcohol Use: No


Hx Substance Use: No


Hx Substance Use Treatment: No





Allergies/Home Meds


Allergies/Adverse Reactions: 


Allergies





Penicillins Allergy (Verified 03/21/18 14:24)


 RASH








Home Medications: 


 Home Meds











 Medication  Instructions  Recorded  Confirmed


 


Aspirin [Ecotrin] 81 mg PO DAILY 08/18/17 05/07/18


 


Cholecalciferol (Vitamin D3) 2,000 unit PO DAILY 03/21/18 05/07/18





[Vitamin D3]   


 


Losartan Potassium 25 mg PO DAILY 03/21/18 05/07/18


 


Paricalcitol [Zemplar] 1 mcg PO QOTHERDAY 03/21/18 05/07/18


 


SITagliptin [Januvia] 50 mg PO DAILY 03/21/18 05/07/18














Review of Systems





- Physician Review


All systems were reviewed & negative as marked: Yes





- Review of Systems


Respiratory: absent: SOB


Cardiovascular: absent: Chest Pain


Gastrointestinal: absent: Abdominal Pain


Psychiatric: Depression.  absent: Suicidal Ideation





Physical Exam


Vital Signs Reviewed: Yes


Vital Signs











  Temp Pulse Resp BP Pulse Ox


 


 05/07/18 16:21    19   99


 


 05/07/18 14:00  97.5 F L  54 L  19  158/69 H  100











Temperature: Hypothermic


Blood Pressure: Hypertensive


Pulse: Bradycardic


Respiratory Rate: Normal


Appearance: Positive for: Well-Appearing, Non-Toxic, Comfortable


Pain Distress: None


Mental Status: Positive for: Alert and Oriented X 3





- Systems Exam


Head: Present: Atraumatic, Normocephalic


Pupils: Present: PERRL


Extroacular Muscles: Present: EOMI


Conjunctiva: Present: Normal


Mouth: Present: Moist Mucous Membranes


Respiratory/Chest: Present: Clear to Auscultation, Good Air Exchange.  No: 

Respiratory Distress, Accessory Muscle Use, Wheezes, Rales, Retracting, Rhonchi


Cardiovascular: Present: Regular Rate and Rhythm, Normal S1, S2.  No: Murmurs


Abdomen: No: Tenderness, Distention, Peritoneal Signs, Rebound, Guarding


Lower Extremity: Present: Other (bilateral legs amputated )


Neurological: Present: GCS=15, CN II-XII Intact, Speech Normal


Skin: Present: Warm, Dry, Normal Color.  No: Rashes


Psychiatric: Present: Alert, Oriented x 3, Normal Insight, Normal Concentration





Medical Decision Making


ED Course and Treatment: 





05/07/18 


Impression:


62 year old male with no acute findings on exam, complaining of feeling 

depressed.





Plan:


-- EKG


-- Chest X-ray


-- Labs


-- Urinalysis 


-- Reassess and disposition








Progress Notes:


 


05/07/18 14:40


EKG:


Ordered, reviewed, and independently interpreted the EKG.


Rate : 52 BPM


Rhythm : sinus bradycardia


Interpretation : No ST-segment elevations or depressions, no T-wave inversions, 

normal intervals.





05/07/18 14:40


Chest X-ray: Creator : Calvin Roland MD


COMPARISON: 03/21/2018 


FINDINGS:


LUNGS: No active pulmonary disease.


PLEURA: No significant pleural effusion identified, no pneumothorax apparent.


CARDIOVASCULAR: Normal.


OSSEOUS STRUCTURES: No significant abnormalities.


VISUALIZED UPPER ABDOMEN: Normal.


OTHER FINDINGS: None.


IMPRESSION: No active disease.





05/07/18 15:35


Upon reevaluation, patient is cleared by PES, patient can be discharge.








- Lab Interpretations


Lab Results: 








 05/07/18 14:05 





 05/07/18 14:05 





 Lab Results





05/07/18 14:05: Alcohol, Quantitative < 10


05/07/18 14:05: Salicylates < 1 L, Acetaminophen < 10.0 L


05/07/18 14:05: Sodium 143, Potassium 4.7, Chloride 106, Carbon Dioxide 23, 

Anion Gap 20, BUN 37 H, Creatinine 2.4 H, Est GFR ( Amer) 33, Est GFR (

Non-Af Amer) 27, Random Glucose 285 H, Calcium 9.4, Total Bilirubin 0.4, AST 26

, ALT 30, Alkaline Phosphatase 92, Total Protein 7.4, Albumin 4.3, Globulin 3.0

, Albumin/Globulin Ratio 1.4


05/07/18 14:05: WBC 7.7, RBC 4.37, Hgb 12.5 L, Hct 35.1 L, MCV 80.3, MCH 28.6, 

MCHC 35.6, RDW 13.6, Plt Count 246, MPV 10.7, Gran % 75.4 H, Lymph % (Auto) 

16.0 L, Mono % (Auto) 5.8, Eos % (Auto) 2.2, Baso % (Auto) 0.6, Gran # 5.80, 

Lymph # (Auto) 1.2, Mono # (Auto) 0.5, Eos # (Auto) 0.2, Baso # (Auto) 0.05








I have reviewed the lab results: Yes





- RAD Interpretation


Radiology Orders: 








05/07/18 14:02


CHEST PORTABLE [RAD] Stat 











: Radiologist





- EKG Interpretation


Interpreted by ED Physician: Yes


Type: 12 lead EKG





- Scribe Statement


The provider has reviewed the documentation as recorded by the Scribe





Griselda Byrddua





Provider Emmett Attestation:


All medical record entries made by the Emmett were at my direction and 

personally dictated by me. I have reviewed the chart and agree that the record 

accurately reflects my personal performance of the history, physical exam, 

medical decision making, and the department course for this patient. I have 

also personally directed, reviewed, and agree with the discharge instructions 

and disposition. 





Disposition/Present on Arrival





- Present on Arrival


Any Indicators Present on Arrival: No


History of DVT/PE: No


History of Uncontrolled Diabetes: No


Urinary Catheter: No


History of Decub. Ulcer: No


History Surgical Site Infection Following: None





- Disposition


Have Diagnosis and Disposition been Completed?: Yes


Diagnosis: 


 Chronic kidney disease





Disposition: HOME/ ROUTINE


Disposition Time: 15:30


Condition: GOOD


Discharge Instructions (ExitCare):  Chronic Kidney Disease


Additional Instructions: 





Thank you for letting us take care of you today. The emergency medical care you 

received today was directed at your acute symptoms. If you were prescribed any 

medication, please fill it and take as directed. It may take several days for 

your symptoms to resolve. Return to the Emergency Department if your symptoms 

worsen, do not improve, or if you have any other problems.





Please contact your doctor or call one of the physicians/clinics you have been 

referred to that are listed on the Patient Visit Information form that is 

included in your discharge packet. Bring any paperwork you were given at 

discharge with you along with any medications you are taking to your follow up 

visit. Our treatment cannot replace ongoing medical care by a primary care 

provider (PCP) outside of the emergency department.





Thank you for allowing the [facility] team to be part of your care today.

















Followup with your primary care doctor in 5-7 days for re-evaluation and 

further management.


Referrals: 


Suzette Briscoe MD [Primary Care Provider] - Follow up with primary


Forms:  [employer] (English)

## 2018-05-08 NOTE — CARD
--------------- APPROVED REPORT --------------





EKG Measurement

Heart [id number]

ND [id number]

[id number]

[id number]

[id number]



<Conclusion>

Sinus bradycardia with sinus arrhythmia

Left axis deviation

ASMI, old

STTW changes c/w ischemia

No change